# Patient Record
Sex: FEMALE | Race: WHITE | HISPANIC OR LATINO | Employment: FULL TIME | URBAN - METROPOLITAN AREA
[De-identification: names, ages, dates, MRNs, and addresses within clinical notes are randomized per-mention and may not be internally consistent; named-entity substitution may affect disease eponyms.]

---

## 2018-04-27 ENCOUNTER — APPOINTMENT (OUTPATIENT)
Dept: RADIOLOGY | Facility: MEDICAL CENTER | Age: 33
End: 2018-04-27
Attending: EMERGENCY MEDICINE

## 2018-04-27 ENCOUNTER — HOSPITAL ENCOUNTER (EMERGENCY)
Facility: MEDICAL CENTER | Age: 33
End: 2018-04-27
Attending: EMERGENCY MEDICINE

## 2018-04-27 VITALS
DIASTOLIC BLOOD PRESSURE: 85 MMHG | HEIGHT: 60 IN | SYSTOLIC BLOOD PRESSURE: 131 MMHG | TEMPERATURE: 97.8 F | WEIGHT: 142.86 LBS | HEART RATE: 68 BPM | BODY MASS INDEX: 28.05 KG/M2 | OXYGEN SATURATION: 99 % | RESPIRATION RATE: 16 BRPM

## 2018-04-27 DIAGNOSIS — O20.0 THREATENED MISCARRIAGE: ICD-10-CM

## 2018-04-27 LAB
ALBUMIN SERPL BCP-MCNC: 4.9 G/DL (ref 3.2–4.9)
ALBUMIN/GLOB SERPL: 1.4 G/DL
ALP SERPL-CCNC: 43 U/L (ref 30–99)
ALT SERPL-CCNC: 13 U/L (ref 2–50)
ANION GAP SERPL CALC-SCNC: 10 MMOL/L (ref 0–11.9)
APPEARANCE UR: ABNORMAL
AST SERPL-CCNC: 15 U/L (ref 12–45)
B-HCG SERPL-ACNC: ABNORMAL MIU/ML (ref 0–5)
BACTERIA #/AREA URNS HPF: ABNORMAL /HPF
BASOPHILS # BLD AUTO: 0.6 % (ref 0–1.8)
BASOPHILS # BLD: 0.08 K/UL (ref 0–0.12)
BILIRUB SERPL-MCNC: 0.5 MG/DL (ref 0.1–1.5)
BILIRUB UR QL STRIP.AUTO: NEGATIVE
BUN SERPL-MCNC: 8 MG/DL (ref 8–22)
CALCIUM SERPL-MCNC: 10.3 MG/DL (ref 8.5–10.5)
CHLORIDE SERPL-SCNC: 100 MMOL/L (ref 96–112)
CO2 SERPL-SCNC: 24 MMOL/L (ref 20–33)
COLOR UR: YELLOW
CREAT SERPL-MCNC: 0.52 MG/DL (ref 0.5–1.4)
EOSINOPHIL # BLD AUTO: 0.05 K/UL (ref 0–0.51)
EOSINOPHIL NFR BLD: 0.4 % (ref 0–6.9)
EPI CELLS #/AREA URNS HPF: NEGATIVE /HPF
ERYTHROCYTE [DISTWIDTH] IN BLOOD BY AUTOMATED COUNT: 43 FL (ref 35.9–50)
GLOBULIN SER CALC-MCNC: 3.5 G/DL (ref 1.9–3.5)
GLUCOSE SERPL-MCNC: 77 MG/DL (ref 65–99)
GLUCOSE UR STRIP.AUTO-MCNC: NEGATIVE MG/DL
HCG UR QL: POSITIVE
HCT VFR BLD AUTO: 41.8 % (ref 37–47)
HGB BLD-MCNC: 14.1 G/DL (ref 12–16)
HYALINE CASTS #/AREA URNS LPF: ABNORMAL /LPF
IMM GRANULOCYTES # BLD AUTO: 0.05 K/UL (ref 0–0.11)
IMM GRANULOCYTES NFR BLD AUTO: 0.4 % (ref 0–0.9)
KETONES UR STRIP.AUTO-MCNC: NEGATIVE MG/DL
LEUKOCYTE ESTERASE UR QL STRIP.AUTO: NEGATIVE
LIPASE SERPL-CCNC: 7 U/L (ref 11–82)
LYMPHOCYTES # BLD AUTO: 2.63 K/UL (ref 1–4.8)
LYMPHOCYTES NFR BLD: 18.7 % (ref 22–41)
MCH RBC QN AUTO: 30.5 PG (ref 27–33)
MCHC RBC AUTO-ENTMCNC: 33.7 G/DL (ref 33.6–35)
MCV RBC AUTO: 90.3 FL (ref 81.4–97.8)
MICRO URNS: ABNORMAL
MONOCYTES # BLD AUTO: 0.65 K/UL (ref 0–0.85)
MONOCYTES NFR BLD AUTO: 4.6 % (ref 0–13.4)
NEUTROPHILS # BLD AUTO: 10.62 K/UL (ref 2–7.15)
NEUTROPHILS NFR BLD: 75.3 % (ref 44–72)
NITRITE UR QL STRIP.AUTO: NEGATIVE
NRBC # BLD AUTO: 0 K/UL
NRBC BLD-RTO: 0 /100 WBC
NUMBER OF RH DOSES IND 8505RD: NORMAL
PH UR STRIP.AUTO: 7.5 [PH]
PLATELET # BLD AUTO: 352 K/UL (ref 164–446)
PMV BLD AUTO: 9.4 FL (ref 9–12.9)
POTASSIUM SERPL-SCNC: 3.4 MMOL/L (ref 3.6–5.5)
PROT SERPL-MCNC: 8.4 G/DL (ref 6–8.2)
PROT UR QL STRIP: NEGATIVE MG/DL
RBC # BLD AUTO: 4.63 M/UL (ref 4.2–5.4)
RBC # URNS HPF: ABNORMAL /HPF
RBC UR QL AUTO: NEGATIVE
RH BLD: NORMAL
SODIUM SERPL-SCNC: 134 MMOL/L (ref 135–145)
SP GR UR REFRACTOMETRY: 1.02
SP GR UR STRIP.AUTO: 1.02
UROBILINOGEN UR STRIP.AUTO-MCNC: 0.2 MG/DL
WBC # BLD AUTO: 14.1 K/UL (ref 4.8–10.8)
WBC #/AREA URNS HPF: ABNORMAL /HPF

## 2018-04-27 PROCEDURE — 83690 ASSAY OF LIPASE: CPT

## 2018-04-27 PROCEDURE — 81025 URINE PREGNANCY TEST: CPT

## 2018-04-27 PROCEDURE — 86901 BLOOD TYPING SEROLOGIC RH(D): CPT

## 2018-04-27 PROCEDURE — 84702 CHORIONIC GONADOTROPIN TEST: CPT

## 2018-04-27 PROCEDURE — 99284 EMERGENCY DEPT VISIT MOD MDM: CPT

## 2018-04-27 PROCEDURE — 81001 URINALYSIS AUTO W/SCOPE: CPT

## 2018-04-27 PROCEDURE — 85025 COMPLETE CBC W/AUTO DIFF WBC: CPT

## 2018-04-27 PROCEDURE — 80053 COMPREHEN METABOLIC PANEL: CPT

## 2018-04-27 PROCEDURE — 76817 TRANSVAGINAL US OBSTETRIC: CPT

## 2018-04-27 NOTE — ED PROVIDER NOTES
ED Provider Note    CHIEF COMPLAINT  Chief Complaint   Patient presents with   • Abdominal Pain       HPI  Molly Cota is a 32 y.o. female who presents with abdominal discomfort. The patient states the pain started earlier today when she was in a verbal altercation with her ex-. She states her last normal menstrual period was in February. She did take a recent printed test that was positive. She states before that she has had some spotting but does not have any current bleeding. She has not had any recent vaginal discharge. She denies dysuria. The patient has not had any fevers. She does have some associated nausea and vomiting. The patient states the pain is mild in intensity and described as cramping.    REVIEW OF SYSTEMS  See HPI for further details. All other systems are negative.     PAST MEDICAL HISTORY  No past medical history on file.    SOCIAL HISTORY  Social History     Social History   • Marital status:      Spouse name: N/A   • Number of children: N/A   • Years of education: N/A     Social History Main Topics   • Smoking status: Never Smoker   • Smokeless tobacco: Not on file   • Alcohol use No   • Drug use: No   • Sexual activity: Not on file     Other Topics Concern   • Not on file     Social History Narrative   • No narrative on file           PHYSICAL EXAM  VITAL SIGNS: /86   Pulse 70   Temp 36.6 °C (97.8 °F) (Temporal)   Resp 16   Ht 1.524 m (5')   Wt 64.8 kg (142 lb 13.7 oz)   SpO2 98%   BMI 27.90 kg/m²   Constitutional: Well developed, Well nourished, No acute distress, Non-toxic appearance.   HENT: Normocephalic, Atraumatic, tympanic membranes are intact and nonerythematous bilaterally, Oropharynx moist without exudates or erythema, Nose normal.   Eyes: PERRLA, EOMI, Conjunctiva normal.  Neck: Supple without meningismus  Lymphatic: No lymphadenopathy noted.   Cardiovascular: Normal heart rate, Normal rhythm, No murmurs, No rubs, No gallops.   Thorax &  Lungs: Normal breath sounds, No respiratory distress, No wheezing, No chest tenderness.   Abdomen: Bowel sounds normal, Soft, No tenderness, no rebound, no guarding, no distention, No masses, No pulsatile masses.   Skin: Warm, Dry, No erythema, No rash.   Back: No tenderness, No CVA tenderness.   Extremities: Atraumatic with symmetric distal pulses, No edema, No tenderness, No cyanosis, No clubbing.   Neurologic: Alert & oriented x 3, cranial nerves II through XII are intact, Normal motor function, Normal sensory function, No focal deficits noted.   Psychiatric: Affect normal, Judgment normal, Mood normal.     RADIOLOGY/PROCEDURES  US-OB PELVIS TRANSVAGINAL   Final Result         1. Single living intrauterine pregnancy at  6 weeks 3 days estimated gestational age.      2. Small subchorionic hemorrhage.                  COURSE & MEDICAL DECISION MAKING  Pertinent Labs & Imaging studies reviewed. (See chart for details)  This a 32-year-old female who presents with cramping abdominal pain and spotting. The ultrasound does show intrauterine implantation with a small subchorionic hemorrhage. I suspect this is the source of bleeding. The patient will therefore drink lots of fluids and she'll follow up with the pregnancy Center. She will return for increased bleeding or pain. Otherwise her abdomen is benign and she does not have any evidence of surgical process.    FINAL IMPRESSION  1. Threatened miscarriage  2. Intrauterine abdominal pain     Disposition  The patient will be discharged in stable condition    Electronically signed by: Mike Salamanca, 4/27/2018 1:45 PM

## 2018-04-27 NOTE — DISCHARGE INSTRUCTIONS
Drink lots of fluids  Follow-up with the pregnancy Center as discussed  Return for increased pain or bleeding

## 2018-04-27 NOTE — ED TRIAGE NOTES
Patient comes in stating she is pregnant, she does not know how far long she is.  Complaints of abd pain, has not seen an ob yet.  Some spotting noted.  Positive n/v.

## 2018-04-30 ENCOUNTER — NON-PROVIDER VISIT (OUTPATIENT)
Dept: OBGYN | Facility: CLINIC | Age: 33
End: 2018-04-30

## 2018-04-30 DIAGNOSIS — Z32.01 PREGNANCY EXAMINATION OR TEST, POSITIVE RESULT: ICD-10-CM

## 2018-04-30 LAB
INT CON NEG: NEGATIVE
INT CON POS: POSITIVE
POC URINE PREGNANCY TEST: POSITIVE

## 2018-04-30 PROCEDURE — 81025 URINE PREGNANCY TEST: CPT | Performed by: OBSTETRICS & GYNECOLOGY

## 2018-06-08 ENCOUNTER — INITIAL PRENATAL (OUTPATIENT)
Dept: OBGYN | Facility: CLINIC | Age: 33
End: 2018-06-08

## 2018-06-08 ENCOUNTER — HOSPITAL ENCOUNTER (OUTPATIENT)
Facility: MEDICAL CENTER | Age: 33
End: 2018-06-08
Attending: NURSE PRACTITIONER
Payer: COMMERCIAL

## 2018-06-08 ENCOUNTER — HOSPITAL ENCOUNTER (OUTPATIENT)
Dept: LAB | Facility: MEDICAL CENTER | Age: 33
End: 2018-06-08
Attending: NURSE PRACTITIONER
Payer: COMMERCIAL

## 2018-06-08 VITALS
HEIGHT: 60 IN | BODY MASS INDEX: 27.88 KG/M2 | DIASTOLIC BLOOD PRESSURE: 60 MMHG | SYSTOLIC BLOOD PRESSURE: 114 MMHG | WEIGHT: 142 LBS

## 2018-06-08 DIAGNOSIS — Z34.82 ENCOUNTER FOR SUPERVISION OF OTHER NORMAL PREGNANCY, SECOND TRIMESTER: Primary | ICD-10-CM

## 2018-06-08 DIAGNOSIS — Z34.82 ENCOUNTER FOR SUPERVISION OF OTHER NORMAL PREGNANCY, SECOND TRIMESTER: ICD-10-CM

## 2018-06-08 LAB
ABO GROUP BLD: NORMAL
APPEARANCE UR: CLEAR
APPEARANCE UR: CLEAR
BASOPHILS # BLD AUTO: 0.3 % (ref 0–1.8)
BASOPHILS # BLD: 0.03 K/UL (ref 0–0.12)
BILIRUB UR QL STRIP.AUTO: NEGATIVE
BILIRUB UR STRIP-MCNC: NORMAL MG/DL
BLD GP AB SCN SERPL QL: NORMAL
COLOR UR AUTO: YELLOW
COLOR UR: YELLOW
EOSINOPHIL # BLD AUTO: 0.09 K/UL (ref 0–0.51)
EOSINOPHIL NFR BLD: 1 % (ref 0–6.9)
ERYTHROCYTE [DISTWIDTH] IN BLOOD BY AUTOMATED COUNT: 40.4 FL (ref 35.9–50)
GLUCOSE UR STRIP.AUTO-MCNC: NEGATIVE MG/DL
GLUCOSE UR STRIP.AUTO-MCNC: NEGATIVE MG/DL
HBV SURFACE AG SER QL: NEGATIVE
HCT VFR BLD AUTO: 39.1 % (ref 37–47)
HGB BLD-MCNC: 13.4 G/DL (ref 12–16)
HIV 1+2 AB+HIV1 P24 AG SERPL QL IA: NON REACTIVE
IMM GRANULOCYTES # BLD AUTO: 0.02 K/UL (ref 0–0.11)
IMM GRANULOCYTES NFR BLD AUTO: 0.2 % (ref 0–0.9)
KETONES UR STRIP.AUTO-MCNC: NEGATIVE MG/DL
KETONES UR STRIP.AUTO-MCNC: NEGATIVE MG/DL
LEUKOCYTE ESTERASE UR QL STRIP.AUTO: NEGATIVE
LEUKOCYTE ESTERASE UR QL STRIP.AUTO: NORMAL
LYMPHOCYTES # BLD AUTO: 1.71 K/UL (ref 1–4.8)
LYMPHOCYTES NFR BLD: 19.9 % (ref 22–41)
MCH RBC QN AUTO: 31.1 PG (ref 27–33)
MCHC RBC AUTO-ENTMCNC: 34.3 G/DL (ref 33.6–35)
MCV RBC AUTO: 90.7 FL (ref 81.4–97.8)
MICRO URNS: ABNORMAL
MONOCYTES # BLD AUTO: 0.39 K/UL (ref 0–0.85)
MONOCYTES NFR BLD AUTO: 4.5 % (ref 0–13.4)
NEUTROPHILS # BLD AUTO: 6.36 K/UL (ref 2–7.15)
NEUTROPHILS NFR BLD: 74.1 % (ref 44–72)
NITRITE UR QL STRIP.AUTO: NEGATIVE
NITRITE UR QL STRIP.AUTO: NEGATIVE
NRBC # BLD AUTO: 0 K/UL
NRBC BLD-RTO: 0 /100 WBC
PH UR STRIP.AUTO: 7 [PH] (ref 5–8)
PH UR STRIP.AUTO: 7.5 [PH]
PLATELET # BLD AUTO: 288 K/UL (ref 164–446)
PMV BLD AUTO: 10 FL (ref 9–12.9)
PROT UR QL STRIP: NEGATIVE MG/DL
PROT UR QL STRIP: NORMAL MG/DL
RBC # BLD AUTO: 4.31 M/UL (ref 4.2–5.4)
RBC UR QL AUTO: NEGATIVE
RBC UR QL AUTO: NEGATIVE
RH BLD: NORMAL
RUBV AB SER QL: >500 IU/ML
SP GR UR STRIP.AUTO: 1.01
SP GR UR STRIP.AUTO: 1.01
TREPONEMA PALLIDUM IGG+IGM AB [PRESENCE] IN SERUM OR PLASMA BY IMMUNOASSAY: NON REACTIVE
UROBILINOGEN UR STRIP-MCNC: NORMAL MG/DL
UROBILINOGEN UR STRIP.AUTO-MCNC: 0.2 MG/DL
WBC # BLD AUTO: 8.6 K/UL (ref 4.8–10.8)

## 2018-06-08 PROCEDURE — 81002 URINALYSIS NONAUTO W/O SCOPE: CPT | Performed by: NURSE PRACTITIONER

## 2018-06-08 PROCEDURE — 59401 PR NEW OB VISIT: CPT | Performed by: NURSE PRACTITIONER

## 2018-06-08 NOTE — PROGRESS NOTES
Pt here for New OB today  Phone: [phone]  Pharmacy verified  Pt is taking PNV  Desires AFP  Declines BTL  Pt seen at [facility] ER on 4/27/18 for abdominal pain and vaginal bleeding; resolved

## 2018-06-08 NOTE — LETTER
Estudios Para Detectar los Portadores de la Fibrosis Quística   (La Enfermedad Fibroquística del Páncreas)    Molly Cota    Esta información esta relacionada con un examen de chris que puede determinar si usted o atwood ernesto es portador del gen que causa la enfermedad hereditaria que se llama la fibrosis quística.     ¿QUE ES LA ENFERMEDAD FIBROSIS QUÍSTICA?  · La  fibrosis quística es george enfermedad hereditaria que afecta a más de 25,000 niños y jóvenes adultos americanos.  · Los síntomas de la fibrosis quística varían de george persona a otra.  Los síntomas más comunes son congestión pulmonar, diarrea y retraso en el crecimiento del latosha.  La mayoría de las personas con la fibrosis quística padecen de problemas médicos muy severos, y algunas mueren jóvenes.  Otras tienen tan pocos síntomas que no se percatan de que tienen fibrosis quística.  · La fibrosis quística no afecta en absoluto la inteligencia de la persona.  · Aunque actualmente no hay george deja para la fibrosis quística, los científicos están avanzando con respecto a nuevos tratamientos y en la búsqueda de la deja.  Anteriormente la mayoría de las personas que padecían de fibrosis quística morían muy jóvenes.  Hoy en día, muchas personas que padecen de la fibrosis quística sobreviven hasta los 20 o 30 anos de edad.    ¿EXISTE LA POSIBILIDAD DE QUE MI MAGDA PUEDA TENER FIBROSIS QUÍSTICA?  · Usted puede tener un hijo con la fibrosis quística aun cuando no hay nadie en atwood jamari que la padezca.  (To la grafica que sigue.)  · Existe george prueba que puede ayudarle a determinar si juan un gen para la fibrosis quística y si por eso corre un riesgo de tener un hijo con la enfermedad.  · Si ambos padres son portadores del gen para la fibrosis quística, hay george (1) probabilidad entre 4 (25%) en cada embarazo, de que puedan tener un hijo afectada con fibrosis quística.  · Los portadores del gen para la fibrosis quística tienen george copia del gen  normal y el otro gen alterado.  · Las personas con fibrosis quística tienen dos genes alterados para la fibrosis quística,  · Normalmente la mayoría de individuos tienen dos copias normales del gen para fibrosis quística.    Riesgo aproximado de que george ernesto sin familiares con fibrosis quística pueda tener un hijo con fibrosis quística:  Origen / Riesgo  George ernesto Caucásica (de carla meeta):  1 en 2,500  George ernesto :  1 en 8,000  George ernesto Afroamericana (de carla sarah):  1 en 15,000  George ernesto Asiática:  1 en 32,000    ¿EXISTEN PRUEBAS PARA DETECTAR LOS PORTADORES DE LA FIBROSIS QUÍSTICA?  · Hay george prueba de chris para determinar si usted o atwood ernesto portan el gen para la fibrosis quística.  · Es importante entender que la prueba no detecta todos los portadores del gen para la fibrosis quística.  · Si la prueba determina que ambos padres con portadores, se puede realizar otras pruebas para determinar si atwood futuro elo esta afectado.    ¿CUANTO LAVINIA LA PRUEBA PARA DETERMINAR SI SOY PORTADOR(A) DEL GEN PARA LA FIBROSIS QUÍSTICA?  · El costo de la prueba varia según atwood póliza de seguro medico y el laboratorio donde se efectúa el análisis.  · El costo promedio de la prueba es de $300.  · [place]wood consejera genética puede darle mas información acera de esta prueba para determinar si usted es portador de la fibrosis quística.    _____  Si, yo estoy interesada y me gustaria obtener mas información al respecto.  _____  No tengo interés ni en hacerme la prueba para determinar si soy portador(a) de gen para la fibrosis quística ni en recibir mas información al respecto.    Firma del paciente: _____________________________   6/8/2018

## 2018-06-08 NOTE — PATIENT INSTRUCTIONS
P:  1.  GC/CT done. Pap done.         2.  Prenatal labs ordered - lab slip given        3.  Discussed PNV, diet, and adequate water intake        4.  NOB packet given        5.  Return to office in 4 wks        6.  Complete OB US in 7 wks

## 2018-06-08 NOTE — PROGRESS NOTES
Subjective:   Molly Cota is a 32 y.o.   @ EGA: 13w4d ARIANNA: Estimated Date of Delivery: 12/10/18  per [facility] c/w US in ER who presents for her new OB exam.  She has no complaints.  Desires AFP.  Declines CF.  Reports faint FM.  Denies VB, LOF, or cramping.  Denies dysuria, vaginal DC.  Pt is single and lives with son. Works in a restaurant.  Pregnancy is unplanned but wanted.     Initial Prenatal Visit          HPI  Review of Systems   All other systems reviewed and are negative.         Objective:     /60   Ht 1.524 m (5')   Wt 64.4 kg (142 lb)   LMP 2018   BMI 27.73 kg/m²    Wet mount: deferred  FHTs: 162  Fundal ht: 13     Physical Exam   Constitutional: She is oriented to person, place, and time. She appears well-developed and well-nourished.   HENT:   Head: Normocephalic and atraumatic.   Eyes:   Eye and ear exam deferred   Neck: Normal range of motion. Neck supple. No thyromegaly present.   Cardiovascular: Normal rate, regular rhythm, normal heart sounds and intact distal pulses.    Pulmonary/Chest: Effort normal and breath sounds normal. Right breast exhibits no inverted nipple, no mass, no nipple discharge, no skin change and no tenderness. Left breast exhibits no inverted nipple, no mass, no nipple discharge, no skin change and no tenderness.   Abdominal: Soft. Bowel sounds are normal.   Genitourinary: Vagina normal and uterus normal. Pelvic exam was performed with patient supine. There is no rash, tenderness, lesion or injury on the right labia. There is no rash, tenderness, lesion or injury on the left labia. Cervix exhibits no motion tenderness, no discharge and no friability. Right adnexum displays no mass, no tenderness and no fullness. Left adnexum displays no mass, no tenderness and no fullness.   Genitourinary Comments: NSVDx1 - proven to 2700g   Musculoskeletal: Normal range of motion.   Neurological: She is alert and oriented to person, place, and time.    Skin: Skin is warm and dry. Capillary refill takes less than 2 seconds.   Psychiatric: She has a normal mood and affect. Her behavior is normal. Judgment and thought content normal.   Nursing note and vitals reviewed.        A:   1.  IUP @ 13w4d ARIANNA: Estimated Date of Delivery: 12/10/18 per [facility]         2.  S=D        3.    Patient Active Problem List    Diagnosis Date Noted   • Encounter for supervision of other normal pregnancy, second trimester 06/08/2018         P:  1.  GC/CT done. Pap done.         2.  Prenatal labs ordered - lab slip given        3.  Discussed PNV, diet, and adequate water intake        4.  NOB packet given        5.  Return to office in 4 wks        6.  Complete OB US in 7 wks

## 2018-06-09 ENCOUNTER — APPOINTMENT (OUTPATIENT)
Dept: RADIOLOGY | Facility: MEDICAL CENTER | Age: 33
End: 2018-06-09
Attending: EMERGENCY MEDICINE

## 2018-06-09 ENCOUNTER — HOSPITAL ENCOUNTER (EMERGENCY)
Facility: MEDICAL CENTER | Age: 33
End: 2018-06-09
Attending: EMERGENCY MEDICINE

## 2018-06-09 VITALS
SYSTOLIC BLOOD PRESSURE: 125 MMHG | TEMPERATURE: 98.1 F | DIASTOLIC BLOOD PRESSURE: 92 MMHG | RESPIRATION RATE: 16 BRPM | BODY MASS INDEX: 28.42 KG/M2 | OXYGEN SATURATION: 99 % | HEART RATE: 68 BPM | WEIGHT: 145.5 LBS

## 2018-06-09 DIAGNOSIS — O44.02 PLACENTA PREVIA ANTEPARTUM IN SECOND TRIMESTER: ICD-10-CM

## 2018-06-09 DIAGNOSIS — O46.90 VAGINAL BLEEDING IN PREGNANCY: ICD-10-CM

## 2018-06-09 PROCEDURE — 99284 EMERGENCY DEPT VISIT MOD MDM: CPT

## 2018-06-09 PROCEDURE — 76817 TRANSVAGINAL US OBSTETRIC: CPT

## 2018-06-09 PROCEDURE — 36415 COLL VENOUS BLD VENIPUNCTURE: CPT

## 2018-06-09 ASSESSMENT — PAIN SCALES - GENERAL
PAINLEVEL_OUTOF10: 1
PAINLEVEL_OUTOF10: 1

## 2018-06-10 NOTE — ED NOTES
Report received.  Assumed care.  Pt assessed, A&O x 4.  Educated on fall precautions and pt verbalizes understanding.  Call light and monitor within reach, bed in lowest position.  Will continue to monitor.

## 2018-06-10 NOTE — DISCHARGE INSTRUCTIONS
Placenta previa   (Placenta Previa)  La placenta previa es un trastorno que padece george diamond embarazada cuando la placenta se implanta en la parte inferior del útero. La placenta cubre de manera parcial o total la abertura del gerald del útero. Es un problema, ya que el bebé debe pasar a través del gerald del útero miller el parto. Hay clayton tipos de placenta previa. Ellos son:   1. Placenta previa marginal. La placenta está cerca del gerald uterino rosibel no cubre la abertura.  2. Placenta previa parcial. La placenta cubre george parte de la abertura del gerald del útero.  3. Placenta previa completa. La placenta cubre toda la abertura del gerald del útero.    Según el tipo de placenta previa,existe la probabilidad de que la placenta pueda ubicarse en george posición normal y que deje de cubrir el gerald del útero, a medida que el embarazo avanza. Es importante que cumpla con todos los controles prenatales.   FACTORES DE RIESGO   Es más probable que sufra placenta previa si:   · Está embarazada de más de un bebé (embarazo múltiple).    · La forma de atwood útero no es normal.    · Tiene cicatrices en el revestimiento interno del útero.    · Tuvo cirugías anteriores en el útero, laz george cesárea.    · Ya ha tenido un bebé.    · Tiene george historia de placenta previa.    · Ha fumado o ingerido cocaína miller el embarazo.    · Está embarazada y tiene 35 años o más.    SÍNTOMAS   El síntoma principal de la placenta previa es un sangrado vaginal indoloro, súbito, miller la segunda mitad del embarazo. La cantidad del sangrado puede ser desde ligeramente leve a abundante. El sangrado puede detenerse por sí mismo, rosibel siempre vuelve a ocurrir. También puede eleazar cólicos, contracciones regulares, dolor abdominal y dolor en la cintura.   DIAGNÓSTICO   La placenta previa puede diagnosticarse con george ecografía, encontrando la ubicación de la placenta. La ecografía puede encontrar la placenta previa ya sea miller george visita prenatal de  rutina o luego de que se advierte un sangrado vaginal. Si le diagnostican placenta previa, el médico evitará los exámenes vaginales para reducir el riesgo de un sangrado abundante. Es posible que la placenta previa no se diagnostique hasta que se produzca el sangrado miller el trabajo de parto.   TRATAMIENTO   El tratamiento específico depende de:   · La cantidad de sangrado o si éste se ha detenido.  · En qué etapa se encuentra del embarazo.    · El estado del bebé.    · La ubicación del bebé y de la placenta.    · El tipo de placenta previa.    Según esos factores, el médico podrá indicar:   · Que disminuya la actividad física    · Hacer reposo en cama, en atwood casa o en el hospital.  · Reposo pélvico [place] significa que no tenga relaciones sexuales, no use tampones, no se lisa exámenes pélvicos ni introduzca nada dentro de la vagina.  · George transfusión sanguínea para reponer la pérdida de chris materna.  · George cesárea si el sangrado es abundante y no puede ser controlado o si la placenta cubre completamente el gerald.  · Medicamentos para detener un trabajo de parto prematuro o para que maduren los pulmones del feto si es necesario iniciar el parto antes de que el embarazo llegue a término.    ¿EN QUÉ MOMENTO DEBE BUSCAR ASISTENCIA MÉDICA DE INMEDIATO SI LA ENVÍAN A ATWOOD CASA CON EL DIAGNÓSTICO DE PLACENTA PREVIA?   Solicite atención médica de inmediato si tiene síntomas de placenta previa. Debe concurrir al hospital para ser controlada inmediatamente. Nuevamente, esos síntomas son:   · Sangrado vaginal repentino e indoloro, aún george pequeña cantidad.  · Cólicos o contracciones regulares.  · Dolor en la cintura o en el abdomen.  Esta información no tiene laz fin reemplazar el consejo del médico. Asegúrese de hacerle al médico cualquier pregunta que tenga.  Document Released: 09/27/2006 Document Revised: 08/20/2014  Elsevier Interactive Patient Education © 2017 Elsevier Inc.

## 2018-06-10 NOTE — ED TRIAGE NOTES
Molly Cota    Chief Complaint   Patient presents with   • Vaginal Bleeding   • Pregnancy     13 weeks   • Abdominal Cramping       Pt  ambulatory to triage with above complaint.Symptoms started 15 min ago.  Pt at work and noticed bright red blood after using bathroom.  Pt denying clots. +bilateral lower ab pain. Pt states she is 13 weeks pregnant. VSS. Pt given UA cup and maternity pad.    Pt returned to lob, educated on triage process, and to inform staff of any changes or concerns.    Blood Pressure: 125/92, Pulse: 91, Respiration: 16, Temperature: 36.7 °C (98.1 °F), Weight: 66 kg (145 lb 8.1 oz), Pulse Oximetry: 96 %, O2 Delivery: None (Room Air)

## 2018-06-10 NOTE — ED PROVIDER NOTES
ED Provider Note    CHIEF COMPLAINT  Chief Complaint   Patient presents with   • Vaginal Bleeding   • Pregnancy     13 weeks   • Abdominal Cramping       HPI  Molly Cota is a 32 y.o. female who presents for evaluation of vaginal bleeding in the setting of pregnancy,  at approximately 13 weeks, bleeding began about 15 minutes prior to arrival associated with pelvic cramping.  No dysuria or hematuria, no back pain, no chest pain, shortness of breath or fever.  The patient reports bleeding earlier on in this pregnancy has since resolved she has had no issues since.  Was evaluated by her obstetrics team yesterday, no comp occasions at that time and everything was going well.    REVIEW OF SYSTEMS  Negative for fever, rash, chest pain, dyspnea, nausea, vomiting, diarrhea, headache, focal weakness, focal numbness, focal tingling, back pain. All other systems are negative.     PAST MEDICAL HISTORY  History reviewed. No pertinent past medical history.    FAMILY HISTORY  Family History   Problem Relation Age of Onset   • No Known Problems Mother    • No Known Problems Sister    • No Known Problems Brother        SOCIAL HISTORY  Social History   Substance Use Topics   • Smoking status: Former Smoker     Packs/day: 0.10     Quit date: 2018   • Smokeless tobacco: Never Used   • Alcohol use No       SURGICAL HISTORY  History reviewed. No pertinent surgical history.    CURRENT MEDICATIONS  I personally reviewed the medication list in the charting documentation.     ALLERGIES  No Known Allergies    MEDICAL RECORD  I have reviewed patient's medical record and pertinent results are listed above.      PHYSICAL EXAM  VITAL SIGNS: /92   Pulse 91   Temp 36.7 °C (98.1 °F) (Temporal)   Resp 16   Wt 66 kg (145 lb 8.1 oz)   LMP 2018   SpO2 96%   BMI 28.42 kg/m²    Constitutional: Well appearing patient in no acute distress.  Not toxic, nor ill in appearance.  HENT: Mucus membranes moist.    Eyes: No  scleral icterus. Normal conjunctiva   Neck: Supple, comfortable, nonpainful range of motion.   Cardiovascular: Regular heart rate and rhythm.   Thorax & Lungs: Chest is nontender.  Lungs are clear to auscultation with good air movement bilaterally.  No wheeze, rhonchi, nor rales.   Abdomen: Soft, not obviously distended, minimal suprapubic tenderness, no rebound, no guarding  Skin: Warm, dry. No rash appreciated  Extremities/Musculoskeletal: No sign of trauma. No asymmetric calf tenderness, erythema or edema. Normal range of motion   Neurologic: Alert & oriented. No focal deficits observed.   Psychiatric: Normal affect appropriate for the clinical situation.    DIAGNOSTIC STUDIES / PROCEDURES    RADIOLOGY  US-OB PELVIS TRANSVAGINAL   Final Result         1. Single living intrauterine pregnancy at  13 weeks 4 days estimated gestational age.      2. Complete placenta previa.      3. A 2.2 cm perigestational hemorrhage.                  COURSE & MEDICAL DECISION MAKING  I have reviewed any medical record information, laboratory studies and radiographic results as noted above.    Encounter Summary: This is a 32 y.o. female with bleeding in the setting of pregnancy, just this began about 15 minutes prior to arrival, evaluated yesterday by her obstetrics team, urinalysis was normal, blood work is unremarkable, she is Rh+ based on prior blood work.  At this point, really I think the only thing we need to do is an ultrasound ------ the ultrasound reveals a complete placenta previa, I spoke to the on-call obstetrician who is on-call for Dr. Narvaez, the patient's own after nutrition, she recommends complete pelvic rest and very close follow-up to the obstetrician, strict return instructions discussed      DISPOSITION: Discharged home in stable condition      FINAL IMPRESSION  1. Placenta previa antepartum in second trimester    2. Vaginal bleeding in pregnancy           This dictation was created using voice recognition  software. The accuracy of the dictation is limited to the abilities of the software. I expect there may be some errors of grammar and possibly content. The nursing notes were reviewed and certain aspects of this information were incorporated into this note.    Electronically signed by: Lincoln Broussard, 6/9/2018 6:24 PM

## 2018-06-11 ENCOUNTER — ROUTINE PRENATAL (OUTPATIENT)
Dept: OBGYN | Facility: CLINIC | Age: 33
End: 2018-06-11

## 2018-06-11 VITALS — BODY MASS INDEX: 27.93 KG/M2 | DIASTOLIC BLOOD PRESSURE: 72 MMHG | WEIGHT: 143 LBS | SYSTOLIC BLOOD PRESSURE: 120 MMHG

## 2018-06-11 DIAGNOSIS — O44.02 PLACENTA PREVIA ANTEPARTUM IN SECOND TRIMESTER: ICD-10-CM

## 2018-06-11 DIAGNOSIS — D25.9 UTERINE FIBROID IN PREGNANCY: ICD-10-CM

## 2018-06-11 DIAGNOSIS — O34.10 UTERINE FIBROID IN PREGNANCY: ICD-10-CM

## 2018-06-11 DIAGNOSIS — Z34.82 ENCOUNTER FOR SUPERVISION OF OTHER NORMAL PREGNANCY, SECOND TRIMESTER: Primary | ICD-10-CM

## 2018-06-11 PROCEDURE — 90040 PR PRENATAL FOLLOW UP: CPT | Performed by: NURSE PRACTITIONER

## 2018-06-11 NOTE — PROGRESS NOTES
S: Pt is  at 14w0d here for fit in after ER visit.  Reports that bleeding has improved.  Reports good FM.  Denies VB, LOF,  RUCs or vaginal DC.     O:  Please see above vitals        FHTs: 150        Fundal ht: 14 cm         Pap smear: pending results    A: IUP 14w0d  Patient Active Problem List    Diagnosis Date Noted   • Placenta previa antepartum in second trimester 2018   • Uterine fibroid in pregnancy 2018   • Encounter for supervision of other normal pregnancy, second trimester 2018       P:  1.  Reviewed labs w pt.        2.  Desires AFP.        3.  US is 18.        4.  Questions answered.        5.  Encouraged adequate water intake.        6.  F/u 4 wks.        7.  Previa precautions reviewed w pt.

## 2018-06-11 NOTE — PATIENT INSTRUCTIONS
P:  1.  Reviewed labs w pt.        2.  Desires AFP.        3.  US is 7/30/18.        4.  Questions answered.        5.  Encouraged adequate water intake.        6.  F/u 4 wks.        7.  Previa precautions reviewed w pt.

## 2018-06-11 NOTE — PROGRESS NOTES
Pt. Here for OB/FU today.   U/S on 7/30/18  Good # [phone]  Pt states still having bleeding and stomach pains.   Pt states went to [facility] ER on 6/9/18 for bleeding.  Pharmacy verified.   AFP lab slip given today along with instructions.

## 2018-06-12 DIAGNOSIS — N76.0 BV (BACTERIAL VAGINOSIS): Primary | ICD-10-CM

## 2018-06-12 DIAGNOSIS — B96.89 BV (BACTERIAL VAGINOSIS): Primary | ICD-10-CM

## 2018-06-12 RX ORDER — METRONIDAZOLE 500 MG/1
500 TABLET ORAL EVERY 12 HOURS
Qty: 14 TAB | Refills: 0 | Status: SHIPPED | OUTPATIENT
Start: 2018-06-12 | End: 2018-06-19

## 2018-06-13 ENCOUNTER — TELEPHONE (OUTPATIENT)
Dept: OBGYN | Facility: CLINIC | Age: 33
End: 2018-06-13

## 2018-06-13 LAB
C TRACH DNA GENITAL QL NAA+PROBE: NEGATIVE
CYTOLOGY REG CYTOL: NORMAL
N GONORRHOEA DNA GENITAL QL NAA+PROBE: NEGATIVE
SPECIMEN SOURCE: NORMAL

## 2018-06-13 NOTE — TELEPHONE ENCOUNTER
Notes recorded by Juanis Ryder C.N.M. on 6/12/2018 at 4:36 PM PDT  +BV - rx sent to pharmacy. Please call pt and have her pick it up.  Pt returned Emily's call.  Was notified of vag infection and advised to finish whole treatment, no intercourse while on medication.   Verbalized understanding.

## 2018-06-29 ENCOUNTER — HOSPITAL ENCOUNTER (OUTPATIENT)
Dept: LAB | Facility: MEDICAL CENTER | Age: 33
End: 2018-06-29
Attending: NURSE PRACTITIONER
Payer: COMMERCIAL

## 2018-06-29 DIAGNOSIS — Z34.82 ENCOUNTER FOR SUPERVISION OF OTHER NORMAL PREGNANCY, SECOND TRIMESTER: ICD-10-CM

## 2018-07-05 LAB
# FETUSES US: ABNORMAL
AFP MOM SERPL: 0.54
AFP SERPL-MCNC: 20 NG/ML
AGE - REPORTED: 33.1 YR
CURRENT SMOKER: NO
FAMILY MEMBER DISEASES HX: NO
GA METHOD: ABNORMAL
GA: ABNORMAL WK
HCG MOM SERPL: 2.26
HCG SERPL-ACNC: ABNORMAL IU/L
HX OF HEREDITARY DISORDERS: NO
IDDM PATIENT QL: NO
INHIBIN A MOM SERPL: 2.09
INHIBIN A SERPL-MCNC: 349 PG/ML
INTEGRATED SCN PATIENT-IMP: ABNORMAL
PATHOLOGY STUDY: ABNORMAL
SPECIMEN DRAWN SERPL: ABNORMAL
U ESTRIOL MOM SERPL: 0.77
U ESTRIOL SERPL-MCNC: 0.81 NG/ML

## 2018-07-09 ENCOUNTER — ROUTINE PRENATAL (OUTPATIENT)
Dept: OBGYN | Facility: CLINIC | Age: 33
End: 2018-07-09

## 2018-07-09 VITALS — SYSTOLIC BLOOD PRESSURE: 108 MMHG | DIASTOLIC BLOOD PRESSURE: 62 MMHG | BODY MASS INDEX: 27.54 KG/M2 | WEIGHT: 141 LBS

## 2018-07-09 DIAGNOSIS — O28.0 ABNORMAL ANTENATAL AFP SCREEN: ICD-10-CM

## 2018-07-09 DIAGNOSIS — D25.9 UTERINE FIBROID IN PREGNANCY: ICD-10-CM

## 2018-07-09 DIAGNOSIS — Z34.82 ENCOUNTER FOR SUPERVISION OF OTHER NORMAL PREGNANCY, SECOND TRIMESTER: ICD-10-CM

## 2018-07-09 DIAGNOSIS — O34.10 UTERINE FIBROID IN PREGNANCY: ICD-10-CM

## 2018-07-09 DIAGNOSIS — O44.02 PLACENTA PREVIA ANTEPARTUM IN SECOND TRIMESTER: ICD-10-CM

## 2018-07-09 PROCEDURE — 90040 PR PRENATAL FOLLOW UP: CPT | Performed by: NURSE PRACTITIONER

## 2018-07-09 NOTE — PROGRESS NOTES
SUBJECTIVE:  Pt is a 32 y.o.   at 18w0d  gestation. Presents today for follow-up prenatal care. Reports no issues at this time. No n/v any longer. States eating well. Reports positive  fetal movement. Denies cramping/contractions, bleeding or leaking of fluid. Denies dysuria, headaches, N/V, or other issues at this time. Generally feels well today.     OBJECTIVE:  - See prenatal vitals flow  -   Vitals:    18 0919   BP: 108/62   Weight: 64 kg (141 lb)      Labs - elevated AFP  US scheduled. ER ultrasound previa.            ASSESSMENT:   - IUP at 18w0d    - S=D   -   Patient Active Problem List    Diagnosis Date Noted   • Abnormal  AFP screen 2018   • Placenta previa antepartum in second trimester 2018   • Uterine fibroid in pregnancy 2018   • Encounter for supervision of other normal pregnancy, second trimester 2018         PLAN:  - S/sx pregnancy and labor warning signs vs general discomforts discussed  - Fetal movements and kick counts reviewed   - Adequate hydration reinforced  - Nutrition/exercise/vitamin education; continued PNV  -patient understands placenta previa. AFP discussed and referral given to Dr. Pena office.

## 2018-07-09 NOTE — PROGRESS NOTES
OB f/u. + fetal movement.  Good phone # [phone]  Preferred pharmacy confirmed.  US scheduled for 7-30-18  AFP re-calculation done 7-5-18  Pt c/o some cramping; denies vaginal bleeding, LOF and UC

## 2018-07-10 NOTE — PROGRESS NOTES
Referral faxed to Dr. Pena on 7/10/18.  They will contact patient to schedule appt.  Please check with patient if appt was made/ document. Thank you

## 2018-07-30 ENCOUNTER — HOSPITAL ENCOUNTER (OUTPATIENT)
Dept: LAB | Facility: MEDICAL CENTER | Age: 33
End: 2018-07-30
Attending: OBSTETRICS & GYNECOLOGY
Payer: COMMERCIAL

## 2018-08-06 ENCOUNTER — APPOINTMENT (OUTPATIENT)
Dept: RADIOLOGY | Facility: IMAGING CENTER | Age: 33
End: 2018-08-06
Attending: NURSE PRACTITIONER

## 2018-08-06 ENCOUNTER — ROUTINE PRENATAL (OUTPATIENT)
Dept: OBGYN | Facility: CLINIC | Age: 33
End: 2018-08-06

## 2018-08-06 VITALS — BODY MASS INDEX: 28.71 KG/M2 | SYSTOLIC BLOOD PRESSURE: 110 MMHG | DIASTOLIC BLOOD PRESSURE: 60 MMHG | WEIGHT: 147 LBS

## 2018-08-06 DIAGNOSIS — Z34.82 ENCOUNTER FOR SUPERVISION OF OTHER NORMAL PREGNANCY, SECOND TRIMESTER: ICD-10-CM

## 2018-08-06 DIAGNOSIS — D25.9 UTERINE FIBROID IN PREGNANCY: ICD-10-CM

## 2018-08-06 DIAGNOSIS — O34.10 UTERINE FIBROID IN PREGNANCY: ICD-10-CM

## 2018-08-06 DIAGNOSIS — O44.02 PLACENTA PREVIA ANTEPARTUM IN SECOND TRIMESTER: ICD-10-CM

## 2018-08-06 PROCEDURE — 90040 PR PRENATAL FOLLOW UP: CPT | Performed by: NURSE PRACTITIONER

## 2018-08-06 NOTE — PROGRESS NOTES
OB f/u. + fetal movement.  No VB, LOF or UC's.  Good phone #  [phone]  Pt had appt with Dr. Pena on 7-30-18, pt reports had amnio test done   Pt denies any problems  U/S scheduled for later today

## 2018-08-06 NOTE — PROGRESS NOTES
SUBJECTIVE:  Pt is a 32 y.o.   at 22w0d  gestation. Presents today for follow-up prenatal care. Reports no issues at this time. Had appt with Dr. Pena on  with ultrasound and amniocentesis done. Results pending.  Reports good  fetal movement. Denies cramping/contractions, bleeding or leaking of fluid. Denies dysuria, headaches, N/V, or other issues at this time. Generally feels well today.     OBJECTIVE:  - See prenatal vitals flow  -   Vitals:    18 0950   BP: 110/60   Weight: 66.7 kg (147 lb)      Labs - abnormal AFP  US - no results yet from Dr. Pena.            ASSESSMENT:   - IUP at 22w0d    - S=D   -   Patient Active Problem List    Diagnosis Date Noted   • Abnormal  AFP screen 2018   • Placenta previa antepartum in second trimester 2018   • Uterine fibroid in pregnancy 2018   • Encounter for supervision of other normal pregnancy, second trimester 2018         PLAN:  - S/sx pregnancy and labor warning signs vs general discomforts discussed  - Fetal movements and kick counts reviewed   - Adequate hydration reinforced  - Nutrition/exercise/vitamin education; continued PNV   patient had ultrasound 7 days ago with Dr. Pena along with amniocentesis. Results not yet received. I attempted to cancel ultrasound scheduled here for today as patient had full fetal survey 7 days ago with Dr. Pena. Patient became very angry and refuses to have ultrasound canceled. I expressed she has already had a full fetal survey by the specialist. She stated she will pay for it. She stated she pays 700 dollars per month. I dropped the issue as the patient was not willing to have it canceled to to listen to my explanations.

## 2018-08-09 ENCOUNTER — TELEPHONE (OUTPATIENT)
Dept: OBGYN | Facility: CLINIC | Age: 33
End: 2018-08-09

## 2018-08-09 NOTE — TELEPHONE ENCOUNTER
Pt called requesting amniocentesis results.  Was notified that it goes to process that take up to 2 weeks to get results, then is sent to Dr. Pena, he will review and they will call her w/ results.  We get results after Dr. Pena's office.  Verbalized understanding.

## 2018-08-13 ENCOUNTER — TELEPHONE (OUTPATIENT)
Dept: OBGYN | Facility: CLINIC | Age: 33
End: 2018-08-13

## 2018-08-13 NOTE — TELEPHONE ENCOUNTER
Pt called again requesting amnio results.  Was notified that we don't have those yet, wants to call Dr. Pena's office to find out results. Was notified that this type of testing takes longer that normal result.  Dr. Pena's phone # provided, per pt request.    Not further questions

## 2018-08-14 LAB
TEST NAME 95000: NORMAL
UNCODED TEST RESULT 95040: NORMAL

## 2018-08-21 ENCOUNTER — DOCUMENTATION (OUTPATIENT)
Dept: OBGYN | Facility: CLINIC | Age: 33
End: 2018-08-21

## 2018-09-04 ENCOUNTER — ROUTINE PRENATAL (OUTPATIENT)
Dept: OBGYN | Facility: CLINIC | Age: 33
End: 2018-09-04

## 2018-09-04 VITALS — SYSTOLIC BLOOD PRESSURE: 110 MMHG | DIASTOLIC BLOOD PRESSURE: 68 MMHG | BODY MASS INDEX: 28.71 KG/M2 | WEIGHT: 147 LBS

## 2018-09-04 DIAGNOSIS — Z34.82 ENCOUNTER FOR SUPERVISION OF OTHER NORMAL PREGNANCY, SECOND TRIMESTER: Primary | ICD-10-CM

## 2018-09-04 DIAGNOSIS — D25.9 UTERINE FIBROID IN PREGNANCY: ICD-10-CM

## 2018-09-04 DIAGNOSIS — O34.10 UTERINE FIBROID IN PREGNANCY: ICD-10-CM

## 2018-09-04 DIAGNOSIS — O44.02 PLACENTA PREVIA ANTEPARTUM IN SECOND TRIMESTER: ICD-10-CM

## 2018-09-04 PROCEDURE — 90040 PR PRENATAL FOLLOW UP: CPT | Performed by: NURSE PRACTITIONER

## 2018-09-04 NOTE — PROGRESS NOTES
S) Pt is a 32 y.o.   at 26w1d  gestation. Routine prenatal care today. Pt c/o left sided round ligament pain.  Reassurance provided.    Fetal movement Normal  Cramping no  VB no  LOF no   Denies dysuria. Generally feels well today. Good self-care activities identified. Denies headaches, swelling, visual changes, or epigastric pain .     O) Blood pressure 110/68, weight 66.7 kg (147 lb), last menstrual period 2018.        Labs: given today       PNL: WNL       GCT: given today       AFP:abnormal        GBS: N/A       Pertinent ultrasound - 18 Dr Pena-amniocentisis WNL, US WNL, no previa       TWG 5 lbs    A) IUP at 26w1d       S=D         Patient Active Problem List    Diagnosis Date Noted   • Abnormal  AFP screen 2018   • Placenta previa antepartum in second trimester 2018   • Uterine fibroid in pregnancy 2018   • Encounter for supervision of other normal pregnancy, second trimester 2018          SVE: deferred         TDAP: no       FLU: no        BTL: no       : no       C/S Consent: no       IOL or C/S scheduled: no       LAST PAP: 18 negative         P) s/s ptl vs general discomforts. Fetal movements reviewed. General ed and anticipatory guidance. Nutrition/exercise/vitamin.   Given info on L&D tour.   Encouraged increase protein intake.  3rd trimester lab reqs given.  RTC 2 weeks or PRN.

## 2018-09-04 NOTE — PROGRESS NOTES
OB F/U  Denies VB, LOF, or UC  +FM  Phone#: [phone]  Pharmacy Confirmed.  C/O: Lower abdominal pain  1hr GTT given to patient.

## 2018-09-10 ENCOUNTER — HOSPITAL ENCOUNTER (OUTPATIENT)
Dept: LAB | Facility: MEDICAL CENTER | Age: 33
End: 2018-09-10
Attending: NURSE PRACTITIONER
Payer: COMMERCIAL

## 2018-09-10 DIAGNOSIS — Z34.82 ENCOUNTER FOR SUPERVISION OF OTHER NORMAL PREGNANCY, SECOND TRIMESTER: ICD-10-CM

## 2018-09-10 LAB
GLUCOSE 1H P 50 G GLC PO SERPL-MCNC: 125 MG/DL (ref 70–139)
HCT VFR BLD AUTO: 39 % (ref 37–47)
HGB BLD-MCNC: 13.3 G/DL (ref 12–16)
TREPONEMA PALLIDUM IGG+IGM AB [PRESENCE] IN SERUM OR PLASMA BY IMMUNOASSAY: NON REACTIVE

## 2018-09-17 ENCOUNTER — APPOINTMENT (OUTPATIENT)
Dept: OTHER | Facility: IMAGING CENTER | Age: 33
End: 2018-09-17

## 2018-10-22 ENCOUNTER — ROUTINE PRENATAL (OUTPATIENT)
Dept: OBGYN | Facility: CLINIC | Age: 33
End: 2018-10-22

## 2018-10-22 VITALS — WEIGHT: 155 LBS | BODY MASS INDEX: 30.27 KG/M2 | SYSTOLIC BLOOD PRESSURE: 110 MMHG | DIASTOLIC BLOOD PRESSURE: 64 MMHG

## 2018-10-22 DIAGNOSIS — Z34.82 ENCOUNTER FOR SUPERVISION OF OTHER NORMAL PREGNANCY, SECOND TRIMESTER: ICD-10-CM

## 2018-10-22 PROBLEM — O44.02 PLACENTA PREVIA ANTEPARTUM IN SECOND TRIMESTER: Status: RESOLVED | Noted: 2018-06-11 | Resolved: 2018-10-22

## 2018-10-22 PROCEDURE — 90471 IMMUNIZATION ADMIN: CPT | Performed by: PHYSICIAN ASSISTANT

## 2018-10-22 PROCEDURE — 90715 TDAP VACCINE 7 YRS/> IM: CPT | Performed by: PHYSICIAN ASSISTANT

## 2018-10-22 PROCEDURE — 90686 IIV4 VACC NO PRSV 0.5 ML IM: CPT | Performed by: PHYSICIAN ASSISTANT

## 2018-10-22 PROCEDURE — 90472 IMMUNIZATION ADMIN EACH ADD: CPT | Performed by: PHYSICIAN ASSISTANT

## 2018-10-22 PROCEDURE — 90040 PR PRENATAL FOLLOW UP: CPT | Performed by: PHYSICIAN ASSISTANT

## 2018-10-22 NOTE — PROGRESS NOTES
Pt has no complaints, hasnt had appt in 7 weeks, but had amnio with Dr Pena that was normal. No more US scheduled at this time. Emphasized importance of keeping appts. Pt denies cramping, UCs, Vb, LOF. +FM. Flu and TDap vaccines given today. 1hr GTT, H/H, RPR wnl - pt notified of results. GBS next visit. PTL precautions reviewed. RTC 2 wk or sooner prn.

## 2018-10-22 NOTE — PROGRESS NOTES
Pt here today for OB follow up  Pt states no complaints   Reports +FM  Good # [phone]  Pharmacy Confirmed.  Chaperone offered and not indicated.   Pt given CAROL ANN sheet and instructions.  Pt declines BTL  Pt would like TDAP vaccine.  Pt would like Flu vaccine  Tdap vaccine given. Left Deltoid. VIS given and screening check list reviewed with pt. Verified with KW.  Flu vaccine given. Right Deltoid. VIS given and screening check list reviewed with pt. Verified with KW.

## 2018-10-22 NOTE — LETTER
"Count Your Baby's Movements  Another step to a healthy delivery    Molly Thomasnga             Dept: [phone]    How Many Weeks Pregnant? 33w0d    Date to Begin Counting: 10/22/18                How to use this chart    One way for your physician to keep track of your baby's health is by knowing how often the baby moves (or \"kicks\") in your womb.  You can help your physician to do this by using this chart every day.    Every day, you should see how many hours it takes for your baby to move 10 times.  Start in the morning, as soon as you get up.    · First, write down the time your baby moves until you get to 10.  · Check off one box every time your baby moves until you get to 10.  · Write down the time you finished counting in the last column.  · Total how long it took to count up all 10 movements.  · Finally, fill in the box that shows how long this took.  After counting 10 movements, you no longer have to count any more that day.  The next morning, just start counting again as soon as you get up.    What should you call a \"movement\"?  It is hard to say, because it will feel different from one mother to another and from one pregnancy to the next.  The important thing is that you count the movements the same way throughout your pregnancy.  If you have more questions, you should ask your physician.    Count carefully every day!  SAMPLE:  Week 28    How many hours did it take to feel 10 movements?       Start  Time     1     2     3     4     5     6     7     8     9     10   Finish Time   Mon 8:20 ·  ·  ·  ·  ·  ·  ·  ·  ·  ·  11:40   Tue Wed Thu Fri               Sat               Sun                 IMPORTANT: You should contact your physician if it takes more than two hours for you to feel 10 movements.  Each morning, write down the time and start to count the movements of your baby.  Keep track by checking off one box every time you feel one movement.  When you have " "felt 10 \"kicks\", write down the time you finished counting in the last column.  Then fill in the   box (over the check jeevan) for the number of hours it took.  Be sure to read the complete instructions on the previous page.            "

## 2018-10-22 NOTE — LETTER
Cuente los Movimientos de atwood Bebé  Otro paso importante para la marquis de atwood bebé    Molly Cota     [facility]            Dept: [phone]    ¿Cuántas semanas tiene de embarazo? 33w0d    Fecha cuando tiene que comenzar a contar el movimiento: 10/22/18                    [place] debe usar tarik diagrama    George manera en que atwood doctor puede controlar a marquis de atwood bebé es sabiendo cuantas veces se mueve atwood bebé en el útero, o por medio de las “pataditas”.  Usted podrá ayudarle a atwood médico al usar cada día el siguiente diagrama.    Cada día, usted debe prestar atención a cuantas horas le lleva a atwood bebé moverse 10 veces.  Comience a contar en la mañana, lo antes posible después de haberse levantado.    · Primeramente, escriba la hora en que se mueve atwood bebé, hasta llegar a 10 veces.  · Colóquele un check o palomita a cada cuadrito cada vez que atwood bebé se mueva hasta que complete 10 veces.  · Escriba la hora cuando termine de contar 10 veces en la última columna.  · Sume el total del tiempo que le llevó contar los 10 movimientos.  · Finalmente, complete el cuadrito de cuantas horas le llevó hacerlo.    Después de eleazar contado los 10 movimientos, ya no tendrá que contar los demás movimientos por el tran del día.  A la mañana siguiente, comience a contar de nuevo cuantas veces se mueve el bebé desde el momento en que se levante.    ¿Qué tendría que considerarse un “movimiento”?  Es difícil de decirlo porque es distinto de george madre a otra, y de un embarazo a otro.  Lo importante es que cuente el movimiento de la misma manera miller el transcurso de atwood embarazo.  Si tiene preguntas adicionales, pregúntele a atwood doctor.    ¡Cuente cuidadosamente cada día!     MUESTRA:  Semana 28    ¿Cuántas horas le ha llevado sentir 10 movimientos?        Hora de Inicio     1     2     3     4     5     6     7     8     9     10   Hora de Finlizar   Dustin. 8:20 ·  ·  ·  ·  ·  ·  ·  ·  ·  ·  11:40   Mar.                Mié.               Jue.               Vie.               Sáb.               Dom.                 IMPORTANTE:  Usted debe contactar a atwood doctor si le lleva más de 2 horas sentir 10 movimientos de atwood bebé.    Cada mañana, escriba la hora de inicio y comience a contar los movimientos de atwood bebé.  Hágalo colocándole un check o palomita a cada cuadrito cada vez que sienta un movimiento de atwood bebé.  Cuando haya sentido 10 “pataditas”, escriba la hora en que terminó de contar en la última columna.  Luego, complete en la cajita (arriba de la kalia de check o palomita) el número total de horas que le llevó hacerlo.  Asegúrese de leer completamente las instrucciones en la página anterior.

## 2018-11-05 ENCOUNTER — ROUTINE PRENATAL (OUTPATIENT)
Dept: OBGYN | Facility: CLINIC | Age: 33
End: 2018-11-05

## 2018-11-05 ENCOUNTER — HOSPITAL ENCOUNTER (OUTPATIENT)
Facility: MEDICAL CENTER | Age: 33
End: 2018-11-05
Attending: NURSE PRACTITIONER
Payer: COMMERCIAL

## 2018-11-05 VITALS — SYSTOLIC BLOOD PRESSURE: 104 MMHG | WEIGHT: 157 LBS | BODY MASS INDEX: 30.66 KG/M2 | DIASTOLIC BLOOD PRESSURE: 64 MMHG

## 2018-11-05 DIAGNOSIS — Z3A.35 35 WEEKS GESTATION OF PREGNANCY: ICD-10-CM

## 2018-11-05 PROBLEM — Z34.83 ENCOUNTER FOR SUPERVISION OF OTHER NORMAL PREGNANCY, THIRD TRIMESTER: Status: ACTIVE | Noted: 2018-06-08

## 2018-11-05 PROCEDURE — 90040 PR PRENATAL FOLLOW UP: CPT | Performed by: NURSE PRACTITIONER

## 2018-11-05 NOTE — PROGRESS NOTES
Pt here today for OB follow up  Pt states no complaints   Reports +FM  Good # [phone]  Pharmacy Confirmed.  Chaperone offered and not indicated.   GBS today.

## 2018-11-05 NOTE — PROGRESS NOTES
SUBJECTIVE:  Pt is a 32 y.o.   at 35w0d  gestation. Presents today for follow-up prenatal care. Reports no issues at this time.  Reports good fetal movement. Denies cramping/contractions, bleeding or leaking of fluid. Denies dysuria, headaches, N/V, or other issues at this time. Generally feels well today.     OBJECTIVE:  - See prenatal vitals flow  -   Vitals:    18 1544   BP: 104/64   Weight: 71.2 kg (157 lb)                 ASSESSMENT:   - IUP at 35w0d    - S=D   -   Patient Active Problem List    Diagnosis Date Noted   • Abnormal  AFP screen - Amnio 46XX by Dr Pena 2018   • Uterine fibroid in pregnancy 2018   • Encounter for supervision of other normal pregnancy, third trimester 2018         PLAN:  - S/sx pregnancy and labor warning signs vs general discomforts discussed  - Fetal movements and kick counts reviewed   - Adequate hydration reinforced  - Nutrition/exercise/vitamin education; continued PNV  -Already did tour   - S/p TDAP vacc   - S/p Flu vacc   - GBS collected   - Anticipatory guidance given  - RTC in 1 weeks for follow-up prenatal care

## 2018-11-07 LAB — GP B STREP DNA SPEC QL NAA+PROBE: NEGATIVE

## 2018-11-13 ENCOUNTER — ROUTINE PRENATAL (OUTPATIENT)
Dept: OBGYN | Facility: CLINIC | Age: 33
End: 2018-11-13

## 2018-11-13 VITALS — SYSTOLIC BLOOD PRESSURE: 100 MMHG | DIASTOLIC BLOOD PRESSURE: 60 MMHG | WEIGHT: 161 LBS | BODY MASS INDEX: 31.44 KG/M2

## 2018-11-13 DIAGNOSIS — Z34.83 ENCOUNTER FOR SUPERVISION OF OTHER NORMAL PREGNANCY IN THIRD TRIMESTER: Primary | ICD-10-CM

## 2018-11-13 PROCEDURE — 90040 PR PRENATAL FOLLOW UP: CPT | Performed by: NURSE PRACTITIONER

## 2018-11-13 NOTE — PROGRESS NOTES
OB f/u. + fetal movement.  No VB, LOF or UC's.  Wt:  161lb     BP: 100/60  Good phone # [phone]  Preferred pharmacy confirmed.  GBS negative

## 2018-11-13 NOTE — PROGRESS NOTES
S:  Pt is  at 36w1d here for routine OB follow up.  Reports painful hands and wrists.  Reports good FM.  Denies VB, LOF, RUCs, or vaginal DC.     O:  Please see above vitals.        FHTs: 145        Fundal ht: 35 cm        Fetal position: vertex        SVE: deferred        GBS negative -- reviewed w pt.      A:  IUP at 36w1d  Patient Active Problem List    Diagnosis Date Noted   • Abnormal  AFP screen - Amnio 46XX by Dr Pena 2018   • Uterine fibroid in pregnancy 2018   • Encounter for supervision of other normal pregnancy, third trimester 2018       P:  1.  Continue FKCs.         2.  Labor precautions given.  Instructions given on where to go.  Pt receptive to education.         3.  D/w pt comfort measures for carpal tunnel: heat, wrist braces, Tylenol        4.  Questions answered.         5.  Encouraged adequate water intake       6.  F/u 1wk      .

## 2018-11-20 ENCOUNTER — ROUTINE PRENATAL (OUTPATIENT)
Dept: OBGYN | Facility: CLINIC | Age: 33
End: 2018-11-20

## 2018-11-20 VITALS — WEIGHT: 162 LBS | SYSTOLIC BLOOD PRESSURE: 128 MMHG | DIASTOLIC BLOOD PRESSURE: 78 MMHG | BODY MASS INDEX: 31.64 KG/M2

## 2018-11-20 DIAGNOSIS — O09.893 SUPERVISION OF OTHER HIGH RISK PREGNANCIES, THIRD TRIMESTER: ICD-10-CM

## 2018-11-20 LAB
APPEARANCE UR: NORMAL
BILIRUB UR STRIP-MCNC: NORMAL MG/DL
COLOR UR AUTO: NORMAL
GLUCOSE UR STRIP.AUTO-MCNC: NEGATIVE MG/DL
KETONES UR STRIP.AUTO-MCNC: NEGATIVE MG/DL
LEUKOCYTE ESTERASE UR QL STRIP.AUTO: NEGATIVE
NITRITE UR QL STRIP.AUTO: NEGATIVE
PH UR STRIP.AUTO: 6.5 [PH] (ref 5–8)
PROT UR QL STRIP: NEGATIVE MG/DL
RBC UR QL AUTO: NEGATIVE
SP GR UR STRIP.AUTO: 1.02
UROBILINOGEN UR STRIP-MCNC: NORMAL MG/DL

## 2018-11-20 PROCEDURE — 81002 URINALYSIS NONAUTO W/O SCOPE: CPT | Performed by: NURSE PRACTITIONER

## 2018-11-20 PROCEDURE — 90040 PR PRENATAL FOLLOW UP: CPT | Performed by: NURSE PRACTITIONER

## 2018-11-20 NOTE — PROGRESS NOTES
Pt here today for OB follow up  Pt states very swollen in face, hands and feet  Reports +FM  Good # [phone]  Pharmacy Confirmed.  Chaperone offered and none needed.

## 2018-11-20 NOTE — PROGRESS NOTES
SUBJECTIVE:  Pt is a 33 y.o.   at 37w1d  gestation. Presents today for follow-up prenatal care. Reports no issues at this time.  Reports good fetal movement. Denies cramping/contractions, bleeding or leaking of fluid. Denies dysuria, headaches, N/V, or other issues at this time. Generally feels well today. Is very uncomfortable with carpal tunnel of pregnancy and swelling in feet and lips and has tried all remedies with minimal relief.     OBJECTIVE:  - See prenatal vitals flow  -   Vitals:    18 0833   BP: 128/78   Weight: 73.5 kg (162 lb)                 ASSESSMENT:   - IUP at 37w1d    - S=D   -   Patient Active Problem List    Diagnosis Date Noted   • Abnormal  AFP screen - Amnio 46XX by Dr Pena 2018   • Uterine fibroid in pregnancy 2018   • Encounter for supervision of other normal pregnancy, third trimester 2018         PLAN:  - S/sx pregnancy and labor warning signs vs general discomforts discussed  - Fetal movements and kick counts reviewed   - Adequate hydration reinforced  - Nutrition/exercise/vitamin education; continued PNV  - S/p TDAP vacc   - S/p Flu vacc   - Anticipatory guidance given  - RTC in 1 weeks for follow-up prenatal care

## 2018-11-26 ENCOUNTER — ROUTINE PRENATAL (OUTPATIENT)
Dept: OBGYN | Facility: CLINIC | Age: 33
End: 2018-11-26

## 2018-11-26 VITALS — DIASTOLIC BLOOD PRESSURE: 72 MMHG | WEIGHT: 163 LBS | BODY MASS INDEX: 31.83 KG/M2 | SYSTOLIC BLOOD PRESSURE: 110 MMHG

## 2018-11-26 DIAGNOSIS — Z34.83 ENCOUNTER FOR SUPERVISION OF OTHER NORMAL PREGNANCY, THIRD TRIMESTER: ICD-10-CM

## 2018-11-26 PROCEDURE — 90040 PR PRENATAL FOLLOW UP: CPT | Performed by: NURSE PRACTITIONER

## 2018-11-26 NOTE — PROGRESS NOTES
Pt here today for OB follow up  Pt states having slight cramping    Reports +FM  Good # [phone]  Pharmacy Confirmed.  Chaperone offered and not indicated.   GBS negative.

## 2018-11-26 NOTE — PROGRESS NOTES
SUBJECTIVE:  Pt is a 33 y.o.   at 38w0d  gestation. Presents today for follow-up prenatal care. Reports no issues at this time.  Reports good  fetal movement. Denies cramping/contractions, bleeding or leaking of fluid. Denies dysuria, headaches, N/V, or other issues at this time. Generally feels well today.     OBJECTIVE:  - See prenatal vitals flow  -   Vitals:    18 1444   BP: 110/72   Weight: 73.9 kg (163 lb)     Labs - normal prenatal labs  US - normal fetal survey               ASSESSMENT:   - IUP at 38w0d    - S=D   -   Patient Active Problem List    Diagnosis Date Noted   • Abnormal  AFP screen - Amnio 46XX by Dr Pena 2018   • Uterine fibroid in pregnancy 2018   • Encounter for supervision of other normal pregnancy, third trimester 2018         PLAN:  - S/sx pregnancy and labor warning signs vs general discomforts discussed  - Fetal movements and kick counts reviewed   - Adequate hydration reinforced  - Nutrition/exercise/vitamin education; continued PNV  - Order placed for induction of labor to be scheduled in absence of spontaneous labor.   Patient does not want cervical exam today. States wants cervical exam next week.

## 2018-11-29 ENCOUNTER — HOSPITAL ENCOUNTER (OUTPATIENT)
Facility: MEDICAL CENTER | Age: 33
End: 2018-11-29
Attending: OBSTETRICS & GYNECOLOGY | Admitting: OBSTETRICS & GYNECOLOGY

## 2018-11-29 ENCOUNTER — TELEPHONE (OUTPATIENT)
Dept: OBGYN | Facility: CLINIC | Age: 33
End: 2018-11-29

## 2018-11-29 VITALS — BODY MASS INDEX: 32 KG/M2 | WEIGHT: 163 LBS | HEIGHT: 60 IN

## 2018-11-29 PROCEDURE — 81002 URINALYSIS NONAUTO W/O SCOPE: CPT

## 2018-11-29 PROCEDURE — 59025 FETAL NON-STRESS TEST: CPT

## 2018-11-29 NOTE — TELEPHONE ENCOUNTER
Pt walked in to [facility] c/o R leg being more swollen that the L one, pe denies any HA, vision changes, has not noticed any redness and denies leg feeling hot. Pt also reports feeling tingly toes. Consulted with midwife Patricia Velasco and she recommends patient to go to L&D for further evaluation. Pt notified, verbalized understanding and will comply. Pt had no other questions or concerns

## 2018-11-30 LAB
APPEARANCE UR: CLEAR
COLOR UR AUTO: YELLOW
GLUCOSE UR QL STRIP.AUTO: NEGATIVE MG/DL
KETONES UR QL STRIP.AUTO: NEGATIVE MG/DL
LEUKOCYTE ESTERASE UR QL STRIP.AUTO: NEGATIVE
NITRITE UR QL STRIP.AUTO: NEGATIVE
PH UR STRIP.AUTO: 6.5 [PH]
PROT UR QL STRIP: NEGATIVE MG/DL
RBC UR QL AUTO: NEGATIVE
SP GR UR: 1.01

## 2018-11-30 NOTE — PROGRESS NOTES
[facility] LABOR AND DELIVERY TRIAGE PROGRESS NOTE    PATIENT ID:  NAME:  Molly Cota  MRN:               [id number]  YOB: 1985     33 y.o. female  at 38w3d.    Subjective: Pt presents complaining of swelling in the right ankle starting this morning. Denies pain, is able to ambulate without difficulty.  Pregnancy has been uncomplicated    negative  For CTXS.   negative Feels pain   negative for LOF  negative for vaginal bleeding.   positive for fetal movement    ROS: Patient denies any fever chills, nausea, vomiting, headache, chest pain, shortness of breath, or dysuria or unusual swelling of hands or feet.     Objective:    Vitals:    18 1500   Weight: 73.9 kg (163 lb)   Height: 1.524 m (5')     No data recorded.    General: No acute distress, resting comfortably in bed.  HEENT: normocephalic, nontraumatic, EOMI  Cardiovascular: Heart RRR with no murmurs, rubs or gallops. Distal Pulses 2+  Respiratory: symmetric chest expansion, lungs CTAB, with no wheezes, rales, rhonci  Abdomen: gravid, nontender  Musculoskeletal: strength 5/5 in four extremities. No swelling noted in right leg using tape measure  Neuro: non focal with no numbness, tingling or changes in sensation    Cervix:  not examined  [place]: Uterine Contractions: none seen - mild irritability  FHRM: Baseline 130s, Accels present, no decels, moderate variability    Assessment: 33 y.o. female  at 38w3d    Plan:   1. Discharge home with return precautions for labor/signs/sx of pre-E         Discussed case with DC Perez Attending. Case was discussed and attending agreed with plan prior to discharge of patient.

## 2018-11-30 NOTE — PROGRESS NOTES
33 y.o.  EDC 12/10/18 EGA 38.3 here to LDA3 c/o unilateral swelling to her right ankle. Deneis pain, able to ambulate without difficulty. Pt reports positive fetal movement, denies vaginal bleeding or LOF. Clean catch urine specimen obtained and dipped, WNL. Normal pulses and DTRS. Report to Dr. Gatica. Dr. Gatica at bedside, measured extremities with measuring tape, no difference between the two. REactive NST. Discharge order received. 1710 Discharged to home, after  labor precautions.

## 2018-12-03 ENCOUNTER — ROUTINE PRENATAL (OUTPATIENT)
Dept: OBGYN | Facility: CLINIC | Age: 33
End: 2018-12-03

## 2018-12-03 VITALS — WEIGHT: 163 LBS | BODY MASS INDEX: 31.83 KG/M2 | SYSTOLIC BLOOD PRESSURE: 126 MMHG | DIASTOLIC BLOOD PRESSURE: 72 MMHG

## 2018-12-03 DIAGNOSIS — O36.8190 DECREASED FETAL MOVEMENT DURING PREGNANCY, ANTEPARTUM, SINGLE OR UNSPECIFIED FETUS: ICD-10-CM

## 2018-12-03 DIAGNOSIS — Z34.83 ENCOUNTER FOR SUPERVISION OF OTHER NORMAL PREGNANCY, THIRD TRIMESTER: ICD-10-CM

## 2018-12-03 LAB
NST ACOUSTIC STIMULATION: NORMAL
NST ACTION NECESSARY: NORMAL
NST ASSESSMENT: NORMAL
NST BASELINE: NORMAL
NST INDICATIONS: NORMAL
NST OTHER DATA: NORMAL
NST READ BY: NORMAL
NST RETURN: NORMAL
NST UTERINE ACTIVITY: NORMAL

## 2018-12-03 PROCEDURE — 90040 PR PRENATAL FOLLOW UP: CPT | Performed by: NURSE PRACTITIONER

## 2018-12-03 PROCEDURE — 99999 PR NO CHARGE: CPT | Performed by: NURSE PRACTITIONER

## 2018-12-03 NOTE — PROGRESS NOTES
SUBJECTIVE:  Pt is a 33 y.o.   at 39w0d  gestation. Presents today for follow-up prenatal care. Reports  possible decreased fetal movement. Had a visit recently to L&D for right ankle swelling. Denies cramping/contractions, bleeding or leaking of fluid. Denies dysuria, headaches, N/V, or other issues at this time. Generally feels well today.     OBJECTIVE:  - See prenatal vitals flow  -   Vitals:    18 1516   BP: 126/72   Weight: 73.9 kg (163 lb)      Labs - normal prenatal labs  US - normal fetal survey     Edema is bilateral lower extremity, negative Verena's         ASSESSMENT:   - IUP at 39w0d    - S=D   -   Patient Active Problem List    Diagnosis Date Noted   • Abnormal  AFP screen - Amnio 46XX by Dr Pena 2018   • Uterine fibroid in pregnancy 2018   • Encounter for supervision of other normal pregnancy, third trimester 2018         PLAN:  - S/sx pregnancy and labor warning signs vs general discomforts discussed  - Fetal movements and kick counts reviewed   - Adequate hydration reinforced  - Nutrition/exercise/vitamin education; continued PNV  - Nst now for possible decreased fetal movement  I don't suspect a DVT. This presents a normal lower extremity edema in late gestation.

## 2018-12-03 NOTE — PROGRESS NOTES
Pt here today for OB follow up  NEgative GBs, pt aware  Pt states baby is meeting the 10 movements within the 2 hour time but feeling slower movements.   WT: 163 lb  BP: 126/72  Pt scheduled for IOL on Sunday 12/16/18 at 0900. Pt notified and instructions given to patient.   Pt states she has swelling on her feet, and pelvic pressure and pain.   Good # [phone]

## 2018-12-10 ENCOUNTER — ROUTINE PRENATAL (OUTPATIENT)
Dept: OBGYN | Facility: CLINIC | Age: 33
End: 2018-12-10

## 2018-12-10 VITALS — WEIGHT: 165 LBS | DIASTOLIC BLOOD PRESSURE: 70 MMHG | BODY MASS INDEX: 32.22 KG/M2 | SYSTOLIC BLOOD PRESSURE: 122 MMHG

## 2018-12-10 DIAGNOSIS — Z34.83 ENCOUNTER FOR SUPERVISION OF OTHER NORMAL PREGNANCY IN THIRD TRIMESTER: Primary | ICD-10-CM

## 2018-12-10 PROCEDURE — 90040 PR PRENATAL FOLLOW UP: CPT | Performed by: NURSE PRACTITIONER

## 2018-12-10 NOTE — PROGRESS NOTES
S:  Pt is  at 40w0d here for routine OB follow up.  Reports irreg CTX.  Reports good FM.  Denies VB, LOF, RUCs, or vaginal DC.     O:  Please see above vitals.        FHTs: 143        Fundal ht: 326 cm        Fetal position: vertex        SVE: /2        GBS negative -- reviewed w pt.      A:  IUP at 40w0d  Patient Active Problem List    Diagnosis Date Noted   • Abnormal  AFP screen - Amnio 46XX by Dr Pena 2018   • Uterine fibroid in pregnancy 2018   • Encounter for supervision of other normal pregnancy, third trimester 2018       P:  1.  Continue FKCs.         2.  Labor precautions given.  Instructions given on where to go.  Pt receptive to education.         3.  D/w pt IOL policy.  IOL scheduled.        4.  Questions answered.         5.  Encouraged adequate water intake       6.  F/u at L&D on  for IOL.

## 2018-12-10 NOTE — PROGRESS NOTES
OB f/u. + fetal movement.  No VB, LOF.   c/o UC's every 20 minutes yesterday.  Wt: 165lb        BP: 122/70  Good phone # [phone]  Preferred pharmacy confirmed.

## 2018-12-16 ENCOUNTER — APPOINTMENT (OUTPATIENT)
Dept: OBGYN | Facility: MEDICAL CENTER | Age: 33
End: 2018-12-16
Attending: OBSTETRICS & GYNECOLOGY

## 2018-12-16 ENCOUNTER — HOSPITAL ENCOUNTER (INPATIENT)
Facility: MEDICAL CENTER | Age: 33
LOS: 4 days | End: 2018-12-20
Attending: OBSTETRICS & GYNECOLOGY | Admitting: OBSTETRICS & GYNECOLOGY

## 2018-12-16 LAB
BASOPHILS # BLD AUTO: 0.5 % (ref 0–1.8)
BASOPHILS # BLD: 0.05 K/UL (ref 0–0.12)
EOSINOPHIL # BLD AUTO: 0.06 K/UL (ref 0–0.51)
EOSINOPHIL NFR BLD: 0.6 % (ref 0–6.9)
ERYTHROCYTE [DISTWIDTH] IN BLOOD BY AUTOMATED COUNT: 45.1 FL (ref 35.9–50)
HCT VFR BLD AUTO: 39.3 % (ref 37–47)
HGB BLD-MCNC: 13.2 G/DL (ref 12–16)
HOLDING TUBE BB 8507: NORMAL
IMM GRANULOCYTES # BLD AUTO: 0.04 K/UL (ref 0–0.11)
IMM GRANULOCYTES NFR BLD AUTO: 0.4 % (ref 0–0.9)
LYMPHOCYTES # BLD AUTO: 1.65 K/UL (ref 1–4.8)
LYMPHOCYTES NFR BLD: 16 % (ref 22–41)
MCH RBC QN AUTO: 29.6 PG (ref 27–33)
MCHC RBC AUTO-ENTMCNC: 33.6 G/DL (ref 33.6–35)
MCV RBC AUTO: 88.1 FL (ref 81.4–97.8)
MONOCYTES # BLD AUTO: 0.53 K/UL (ref 0–0.85)
MONOCYTES NFR BLD AUTO: 5.1 % (ref 0–13.4)
NEUTROPHILS # BLD AUTO: 8.01 K/UL (ref 2–7.15)
NEUTROPHILS NFR BLD: 77.4 % (ref 44–72)
NRBC # BLD AUTO: 0 K/UL
NRBC BLD-RTO: 0 /100 WBC
PLATELET # BLD AUTO: 242 K/UL (ref 164–446)
PMV BLD AUTO: 10.5 FL (ref 9–12.9)
RBC # BLD AUTO: 4.46 M/UL (ref 4.2–5.4)
WBC # BLD AUTO: 10.3 K/UL (ref 4.8–10.8)

## 2018-12-16 PROCEDURE — 36415 COLL VENOUS BLD VENIPUNCTURE: CPT

## 2018-12-16 PROCEDURE — A9270 NON-COVERED ITEM OR SERVICE: HCPCS | Performed by: OBSTETRICS & GYNECOLOGY

## 2018-12-16 PROCEDURE — 700102 HCHG RX REV CODE 250 W/ 637 OVERRIDE(OP): Performed by: OBSTETRICS & GYNECOLOGY

## 2018-12-16 PROCEDURE — 3E033VJ INTRODUCTION OF OTHER HORMONE INTO PERIPHERAL VEIN, PERCUTANEOUS APPROACH: ICD-10-PCS | Performed by: OBSTETRICS & GYNECOLOGY

## 2018-12-16 PROCEDURE — 3E0P7VZ INTRODUCTION OF HORMONE INTO FEMALE REPRODUCTIVE, VIA NATURAL OR ARTIFICIAL OPENING: ICD-10-PCS | Performed by: OBSTETRICS & GYNECOLOGY

## 2018-12-16 PROCEDURE — 85025 COMPLETE CBC W/AUTO DIFF WBC: CPT

## 2018-12-16 PROCEDURE — 770002 HCHG ROOM/CARE - OB PRIVATE (112)

## 2018-12-16 RX ORDER — ACETAMINOPHEN 325 MG/1
650 TABLET ORAL EVERY 4 HOURS PRN
Status: CANCELLED | OUTPATIENT
Start: 2018-12-16

## 2018-12-16 RX ORDER — MISOPROSTOL 200 UG/1
800 TABLET ORAL
Status: DISCONTINUED | OUTPATIENT
Start: 2018-12-16 | End: 2018-12-17 | Stop reason: HOSPADM

## 2018-12-16 RX ORDER — ONDANSETRON 2 MG/ML
4 INJECTION INTRAMUSCULAR; INTRAVENOUS EVERY 6 HOURS PRN
Status: CANCELLED | OUTPATIENT
Start: 2018-12-16

## 2018-12-16 RX ORDER — SODIUM CHLORIDE, SODIUM LACTATE, POTASSIUM CHLORIDE, CALCIUM CHLORIDE 600; 310; 30; 20 MG/100ML; MG/100ML; MG/100ML; MG/100ML
INJECTION, SOLUTION INTRAVENOUS CONTINUOUS
Status: DISPENSED | OUTPATIENT
Start: 2018-12-16 | End: 2018-12-16

## 2018-12-16 RX ORDER — ONDANSETRON 4 MG/1
4 TABLET, ORALLY DISINTEGRATING ORAL EVERY 6 HOURS PRN
Status: CANCELLED | OUTPATIENT
Start: 2018-12-16

## 2018-12-16 RX ORDER — HYDROCODONE BITARTRATE AND ACETAMINOPHEN 5; 325 MG/1; MG/1
1 TABLET ORAL EVERY 4 HOURS PRN
Status: CANCELLED | OUTPATIENT
Start: 2018-12-16

## 2018-12-16 RX ORDER — METHYLERGONOVINE MALEATE 0.2 MG/ML
0.2 INJECTION INTRAVENOUS
Status: DISCONTINUED | OUTPATIENT
Start: 2018-12-16 | End: 2018-12-17 | Stop reason: HOSPADM

## 2018-12-16 RX ORDER — IBUPROFEN 600 MG/1
600 TABLET ORAL EVERY 6 HOURS PRN
Status: CANCELLED | OUTPATIENT
Start: 2018-12-16

## 2018-12-16 RX ADMIN — MISOPROSTOL 25 MCG: 100 TABLET ORAL at 11:13

## 2018-12-16 RX ADMIN — MISOPROSTOL 25 MCG: 100 TABLET ORAL at 15:30

## 2018-12-16 ASSESSMENT — PATIENT HEALTH QUESTIONNAIRE - PHQ9
2. FEELING DOWN, DEPRESSED, IRRITABLE, OR HOPELESS: NOT AT ALL
SUM OF ALL RESPONSES TO PHQ9 QUESTIONS 1 AND 2: 0
1. LITTLE INTEREST OR PLEASURE IN DOING THINGS: NOT AT ALL
1. LITTLE INTEREST OR PLEASURE IN DOING THINGS: NOT AT ALL
SUM OF ALL RESPONSES TO PHQ9 QUESTIONS 1 AND 2: 0
2. FEELING DOWN, DEPRESSED, IRRITABLE, OR HOPELESS: NOT AT ALL

## 2018-12-16 ASSESSMENT — COPD QUESTIONNAIRES
IN THE PAST 12 MONTHS DO YOU DO LESS THAN YOU USED TO BECAUSE OF YOUR BREATHING PROBLEMS: DISAGREE/UNSURE
COPD SCREENING SCORE: 0
HAVE YOU SMOKED AT LEAST 100 CIGARETTES IN YOUR ENTIRE LIFE: NO/DON'T KNOW
DURING THE PAST 4 WEEKS HOW MUCH DID YOU FEEL SHORT OF BREATH: NONE/LITTLE OF THE TIME
DO YOU EVER COUGH UP ANY MUCUS OR PHLEGM?: NO/ONLY WITH OCCASIONAL COLDS OR INFECTIONS

## 2018-12-16 ASSESSMENT — LIFESTYLE VARIABLES
EVER_SMOKED: NEVER
ALCOHOL_USE: NO

## 2018-12-16 NOTE — PROGRESS NOTES
[facility]OR AND DELIVERY PROGRESS NOTE    PATIENT ID:  NAME:  Molly Cota  MRN:               [id number]  YOB: 1985     33 y.o. female  at 40w6d.    Subjective: Not feeling contractions yet, just some cramps      Objective:    Vitals:    18 1018   BP: 113/84   Pulse: 88   Temp: 37.3 °C (99.1 °F)   TempSrc: Temporal   Weight: 74.8 kg (165 lb)   Height: 1.524 m (5')       Cervix:  1-2cm/60%/-2  [place]: Uterine Contractions: none, some mild irritability  FHRM: Baseline 135, Accels present, no decels, moderate variability  Pitocin: none yet  Pain control: none yet    Assessment: 33 y.o. female  at 40w6d here for IOL for post date s/p 1 dose cytotect    Plan:   1. Will give a second dose of cytotec, reassess in 4 hours  2. Will start pitocin depending on cervical ripening in 4 hours  3. Anticipate  delivery  4. Pain control as desired.

## 2018-12-16 NOTE — H&P
History and Physical      Molly Cota is a 33 y.o. year old female  at 40w6d dated by LMP who presents for IOL due to post due date. Feeling no symptoms now.    PNC with the [facility]PC starting at 13w4d. Complicated by positive AFP/Quad screen, had anatomy scan done by Dr. Pena which was normal, and karyotyping done which was 46XX    Subjective:   negative  For CTXS.   positive and negative Feels pain   negative for LOF  negative for vaginal bleeding.   positive for fetal movement    ROS: Patient denies fever, chills, nausea, vomiting , headache, visual disturbance, swelling of hands/face and dysuria.    History reviewed. No pertinent past medical history.  History reviewed. No pertinent surgical history.  OB History    Para Term  AB Living   2 1 1     1   SAB TAB Ectopic Molar Multiple Live Births             1      # Outcome Date GA Lbr Hira/2nd Weight Sex Delivery Anes PTL Lv   2 Current            1 Term 04 40w0d  2.722 kg (6 lb) M Vag-Spont  N HOLLIS      Birth Comments: No complications        GYN History:  History of irregular heavy periods for the last 7-8 years. Denies history of fibroids  Social History     Social History   • Marital status:      Spouse name: N/A   • Number of children: N/A   • Years of education: N/A     Occupational History   • Not on file.     Social History Main Topics   • Smoking status: Former Smoker     Packs/day: 0.10     Types: Cigarettes     Quit date: 2018   • Smokeless tobacco: Never Used   • Alcohol use No   • Drug use: No   • Sexual activity: Yes     Partners: Male     Other Topics Concern   • Not on file     Social History Narrative   • No narrative on file     Allergies: Patient has no known allergies.  No current facility-administered medications on file prior to encounter.      Current Outpatient Prescriptions on File Prior to Encounter   Medication Sig Dispense Refill   • Prenatal Multivit-Min-Fe-FA (PRENATAL VITAMINS PO)  Take  by mouth.           Objective:      Blood pressure 113/84, pulse 88, temperature 37.3 °C (99.1 °F), temperature source Temporal, height 1.524 m (5'), weight 74.8 kg (165 lb), last menstrual period 2018.    General: No acute distress, resting comfortably in bed.  HEENT: normocephalic, nontraumatic, EOMI  Abdomen: gravid, nontender  Musculoskeletal: strength 5/5 in four extremities  Neuro: non focal with no numbness, tingling or changes in sensation  EFW: 3300g  [place]: Uterine Contractions 0-1 q 20 minutes.   FHRM: Baseline 145, Accels present, no decels, moderate variability  Presentation: cephalic  Cervix:  1cm/50%/-2      Lab Review  Lab:   Blood type: O pos   HBsAg: non-reactive   HIV: non-reactive   GC: negative   Chlamydia: negative    Rubella: immune    GBS: neg  1 hr GTT: 125    Recent Labs      18   0941   ABOGROUP  O   RUBELLAIGG  >500.0   HEPBSAG  Negative       Recent Labs      18   1110   WBC  10.3   RBC  4.46   HEMOGLOBIN  13.2   HEMATOCRIT  39.3   MCV  88.1   MCH  29.6   RDW  45.1   PLATELETCT  242   MPV  10.5   NEUTSPOLYS  77.40*   LYMPHOCYTES  16.00*   MONOCYTES  5.10   EOSINOPHILS  0.60   BASOPHILS  0.50     No results for input(s): SODIUM, POTASSIUM, CHLORIDE, CO2, GLUCOSE, BUN, CPKTOTAL in the last 72 hours.        Assessment/Plan:   Molly Cota is a 33 y.o. year old female  at 40w6d dated by LMP who presents for IOL due to post due date      - Admit to L&D  - Anticipate   - IOL: start with 1 dose Cytotec, will reassess after 4 hours and start pitocin if cervix ripened.  - GBS negative, PNL wnl, O+      OB Attending Addendum:    I have seen Ms. Molly Cota and agree w/ documentation by Dr. Gatica.  Pt is a 33 y.o.yo  @ 40w6d admitted for postdates IOL.  Pregnancy complicated by +QS for DS however had normal amniocentesis w/ Dr. Oki.  Doing well today, SVE by me /- so plan for cervical ripening with cytotec then  pit/AROM.  EFW 3200g, cat I FHTs.      Bisi Dorantes MD  [facility], Women's Health

## 2018-12-16 NOTE — CARE PLAN
Problem: Pain  Goal: Alleviation of Pain or a reduction in pain to the patient's comfort goal    Intervention: Pain Management - Epidural/Spinal  Pt educated on the availability of an epidural for pain  Intervention: Pain Management--Medications  Pt educated on medications available for pain      Problem: Infection  Goal: Will remain free from infection    Intervention: Assess signs and symptoms of infection  Pt is free from s/s of infection  Intervention: Implement standard precautions and perform hand washing before and after patient contact  Hand hygiene performed before and after pt contact

## 2018-12-16 NOTE — PROGRESS NOTES
1015: Pt presents to L&D for scheduled IOL. Pt is a  at 40.6 weeks gestation. POC discussed, pt and family verbalized understanding.

## 2018-12-17 LAB
ERYTHROCYTE [DISTWIDTH] IN BLOOD BY AUTOMATED COUNT: 45.5 FL (ref 35.9–50)
HCT VFR BLD AUTO: 35.9 % (ref 37–47)
HGB BLD-MCNC: 12.2 G/DL (ref 12–16)
MCH RBC QN AUTO: 30 PG (ref 27–33)
MCHC RBC AUTO-ENTMCNC: 34 G/DL (ref 33.6–35)
MCV RBC AUTO: 88.2 FL (ref 81.4–97.8)
PLATELET # BLD AUTO: 212 K/UL (ref 164–446)
PMV BLD AUTO: 10.4 FL (ref 9–12.9)
RBC # BLD AUTO: 4.07 M/UL (ref 4.2–5.4)
WBC # BLD AUTO: 24.2 K/UL (ref 4.8–10.8)

## 2018-12-17 PROCEDURE — 4A1H74Z MONITORING OF PRODUCTS OF CONCEPTION, CARDIAC ELECTRICAL ACTIVITY, VIA NATURAL OR ARTIFICIAL OPENING: ICD-10-PCS | Performed by: OBSTETRICS & GYNECOLOGY

## 2018-12-17 PROCEDURE — 36415 COLL VENOUS BLD VENIPUNCTURE: CPT

## 2018-12-17 PROCEDURE — 700111 HCHG RX REV CODE 636 W/ 250 OVERRIDE (IP)

## 2018-12-17 PROCEDURE — 770002 HCHG ROOM/CARE - OB PRIVATE (112)

## 2018-12-17 PROCEDURE — 59514 CESAREAN DELIVERY ONLY: CPT

## 2018-12-17 PROCEDURE — 59514 CESAREAN DELIVERY ONLY: CPT | Performed by: OBSTETRICS & GYNECOLOGY

## 2018-12-17 PROCEDURE — 305385 HCHG SURGICAL SERVICES 1/4 HOUR: Performed by: OBSTETRICS & GYNECOLOGY

## 2018-12-17 PROCEDURE — 700105 HCHG RX REV CODE 258

## 2018-12-17 PROCEDURE — 85027 COMPLETE CBC AUTOMATED: CPT

## 2018-12-17 PROCEDURE — 304966 HCHG RECOVERY SVSC TIME ADDL 1/2 HR: Performed by: OBSTETRICS & GYNECOLOGY

## 2018-12-17 PROCEDURE — 10907ZC DRAINAGE OF AMNIOTIC FLUID, THERAPEUTIC FROM PRODUCTS OF CONCEPTION, VIA NATURAL OR ARTIFICIAL OPENING: ICD-10-PCS | Performed by: OBSTETRICS & GYNECOLOGY

## 2018-12-17 PROCEDURE — 10H07YZ INSERTION OF OTHER DEVICE INTO PRODUCTS OF CONCEPTION, VIA NATURAL OR ARTIFICIAL OPENING: ICD-10-PCS | Performed by: OBSTETRICS & GYNECOLOGY

## 2018-12-17 PROCEDURE — 700105 HCHG RX REV CODE 258: Performed by: OBSTETRICS & GYNECOLOGY

## 2018-12-17 PROCEDURE — 700111 HCHG RX REV CODE 636 W/ 250 OVERRIDE (IP): Performed by: ANESTHESIOLOGY

## 2018-12-17 PROCEDURE — 700102 HCHG RX REV CODE 250 W/ 637 OVERRIDE(OP)

## 2018-12-17 PROCEDURE — 304964 HCHG RECOVERY ROOM TIME 1HR: Performed by: OBSTETRICS & GYNECOLOGY

## 2018-12-17 PROCEDURE — 306828 HCHG ANES-TIME GENERAL: Performed by: OBSTETRICS & GYNECOLOGY

## 2018-12-17 PROCEDURE — 700102 HCHG RX REV CODE 250 W/ 637 OVERRIDE(OP): Performed by: ANESTHESIOLOGY

## 2018-12-17 PROCEDURE — 700111 HCHG RX REV CODE 636 W/ 250 OVERRIDE (IP): Performed by: OBSTETRICS & GYNECOLOGY

## 2018-12-17 PROCEDURE — A9270 NON-COVERED ITEM OR SERVICE: HCPCS | Performed by: ANESTHESIOLOGY

## 2018-12-17 PROCEDURE — 700111 HCHG RX REV CODE 636 W/ 250 OVERRIDE (IP): Performed by: NURSE PRACTITIONER

## 2018-12-17 RX ORDER — METOCLOPRAMIDE HYDROCHLORIDE 5 MG/ML
10 INJECTION INTRAMUSCULAR; INTRAVENOUS ONCE
Status: COMPLETED | OUTPATIENT
Start: 2018-12-17 | End: 2018-12-17

## 2018-12-17 RX ORDER — ONDANSETRON 2 MG/ML
4 INJECTION INTRAMUSCULAR; INTRAVENOUS EVERY 6 HOURS PRN
Status: DISPENSED | OUTPATIENT
Start: 2018-12-17 | End: 2018-12-18

## 2018-12-17 RX ORDER — HYDROMORPHONE HYDROCHLORIDE 1 MG/ML
0.4 INJECTION, SOLUTION INTRAMUSCULAR; INTRAVENOUS; SUBCUTANEOUS
Status: ACTIVE | OUTPATIENT
Start: 2018-12-17 | End: 2018-12-18

## 2018-12-17 RX ORDER — MEPERIDINE HYDROCHLORIDE 25 MG/ML
12.5 INJECTION INTRAMUSCULAR; INTRAVENOUS; SUBCUTANEOUS
Status: DISCONTINUED | OUTPATIENT
Start: 2018-12-17 | End: 2018-12-17 | Stop reason: HOSPADM

## 2018-12-17 RX ORDER — HALOPERIDOL 5 MG/ML
1 INJECTION INTRAMUSCULAR
Status: DISCONTINUED | OUTPATIENT
Start: 2018-12-17 | End: 2018-12-17 | Stop reason: HOSPADM

## 2018-12-17 RX ORDER — HYDRALAZINE HYDROCHLORIDE 20 MG/ML
5 INJECTION INTRAMUSCULAR; INTRAVENOUS
Status: DISCONTINUED | OUTPATIENT
Start: 2018-12-17 | End: 2018-12-17 | Stop reason: HOSPADM

## 2018-12-17 RX ORDER — DIPHENHYDRAMINE HYDROCHLORIDE 50 MG/ML
12.5 INJECTION INTRAMUSCULAR; INTRAVENOUS EVERY 6 HOURS PRN
Status: CANCELLED | OUTPATIENT
Start: 2018-12-17 | End: 2018-12-18

## 2018-12-17 RX ORDER — METOPROLOL TARTRATE 1 MG/ML
1 INJECTION, SOLUTION INTRAVENOUS
Status: DISCONTINUED | OUTPATIENT
Start: 2018-12-17 | End: 2018-12-17 | Stop reason: HOSPADM

## 2018-12-17 RX ORDER — SODIUM CHLORIDE, SODIUM LACTATE, POTASSIUM CHLORIDE, CALCIUM CHLORIDE 600; 310; 30; 20 MG/100ML; MG/100ML; MG/100ML; MG/100ML
INJECTION, SOLUTION INTRAVENOUS
Status: DISCONTINUED
Start: 2018-12-17 | End: 2018-12-17

## 2018-12-17 RX ORDER — ONDANSETRON 2 MG/ML
4 INJECTION INTRAMUSCULAR; INTRAVENOUS
Status: DISCONTINUED | OUTPATIENT
Start: 2018-12-17 | End: 2018-12-17 | Stop reason: HOSPADM

## 2018-12-17 RX ORDER — SODIUM CHLORIDE, SODIUM LACTATE, POTASSIUM CHLORIDE, CALCIUM CHLORIDE 600; 310; 30; 20 MG/100ML; MG/100ML; MG/100ML; MG/100ML
INJECTION, SOLUTION INTRAVENOUS
Status: COMPLETED
Start: 2018-12-17 | End: 2018-12-17

## 2018-12-17 RX ORDER — LORAZEPAM 2 MG/ML
0.5 INJECTION INTRAMUSCULAR
Status: DISCONTINUED | OUTPATIENT
Start: 2018-12-17 | End: 2018-12-17 | Stop reason: HOSPADM

## 2018-12-17 RX ORDER — CITRIC ACID/SODIUM CITRATE 334-500MG
30 SOLUTION, ORAL ORAL ONCE
Status: COMPLETED | OUTPATIENT
Start: 2018-12-17 | End: 2018-12-17

## 2018-12-17 RX ORDER — KETOROLAC TROMETHAMINE 30 MG/ML
30 INJECTION, SOLUTION INTRAMUSCULAR; INTRAVENOUS EVERY 6 HOURS
Status: COMPLETED | OUTPATIENT
Start: 2018-12-17 | End: 2018-12-18

## 2018-12-17 RX ORDER — DIPHENHYDRAMINE HYDROCHLORIDE 50 MG/ML
25 INJECTION INTRAMUSCULAR; INTRAVENOUS EVERY 6 HOURS PRN
Status: ACTIVE | OUTPATIENT
Start: 2018-12-17 | End: 2018-12-18

## 2018-12-17 RX ORDER — OXYCODONE HYDROCHLORIDE 5 MG/1
5 TABLET ORAL EVERY 4 HOURS PRN
Status: CANCELLED | OUTPATIENT
Start: 2018-12-17 | End: 2018-12-18

## 2018-12-17 RX ORDER — VITAMIN A ACETATE, BETA CAROTENE, ASCORBIC ACID, CHOLECALCIFEROL, .ALPHA.-TOCOPHEROL ACETATE, DL-, THIAMINE MONONITRATE, RIBOFLAVIN, NIACINAMIDE, PYRIDOXINE HYDROCHLORIDE, FOLIC ACID, CYANOCOBALAMIN, CALCIUM CARBONATE, FERROUS FUMARATE, ZINC OXIDE, CUPRIC OXIDE 3080; 12; 120; 400; 1; 1.84; 3; 20; 22; 920; 25; 200; 27; 10; 2 [IU]/1; UG/1; MG/1; [IU]/1; MG/1; MG/1; MG/1; MG/1; MG/1; [IU]/1; MG/1; MG/1; MG/1; MG/1; MG/1
1 TABLET, FILM COATED ORAL EVERY MORNING
Status: DISCONTINUED | OUTPATIENT
Start: 2018-12-17 | End: 2018-12-17

## 2018-12-17 RX ORDER — IBUPROFEN 600 MG/1
600 TABLET ORAL EVERY 6 HOURS PRN
Status: DISCONTINUED | OUTPATIENT
Start: 2018-12-17 | End: 2018-12-17

## 2018-12-17 RX ORDER — OXYCODONE HCL 5 MG/5 ML
5 SOLUTION, ORAL ORAL
Status: DISCONTINUED | OUTPATIENT
Start: 2018-12-17 | End: 2018-12-17 | Stop reason: HOSPADM

## 2018-12-17 RX ORDER — HYDROMORPHONE HYDROCHLORIDE 1 MG/ML
0.2 INJECTION, SOLUTION INTRAMUSCULAR; INTRAVENOUS; SUBCUTANEOUS
Status: CANCELLED | OUTPATIENT
Start: 2018-12-17 | End: 2018-12-18

## 2018-12-17 RX ORDER — HYDROMORPHONE HYDROCHLORIDE 1 MG/ML
0.4 INJECTION, SOLUTION INTRAMUSCULAR; INTRAVENOUS; SUBCUTANEOUS
Status: CANCELLED | OUTPATIENT
Start: 2018-12-17 | End: 2018-12-18

## 2018-12-17 RX ORDER — OXYCODONE HYDROCHLORIDE 10 MG/1
10 TABLET ORAL EVERY 4 HOURS PRN
Status: ACTIVE | OUTPATIENT
Start: 2018-12-17 | End: 2018-12-18

## 2018-12-17 RX ORDER — HYDROMORPHONE HYDROCHLORIDE 1 MG/ML
0.2 INJECTION, SOLUTION INTRAMUSCULAR; INTRAVENOUS; SUBCUTANEOUS
Status: ACTIVE | OUTPATIENT
Start: 2018-12-17 | End: 2018-12-18

## 2018-12-17 RX ORDER — BISACODYL 10 MG
10 SUPPOSITORY, RECTAL RECTAL PRN
Status: DISCONTINUED | OUTPATIENT
Start: 2018-12-17 | End: 2018-12-17

## 2018-12-17 RX ORDER — OXYCODONE HCL 5 MG/5 ML
10 SOLUTION, ORAL ORAL
Status: DISCONTINUED | OUTPATIENT
Start: 2018-12-17 | End: 2018-12-17 | Stop reason: HOSPADM

## 2018-12-17 RX ORDER — DIPHENHYDRAMINE HYDROCHLORIDE 50 MG/ML
12.5 INJECTION INTRAMUSCULAR; INTRAVENOUS EVERY 6 HOURS PRN
Status: ACTIVE | OUTPATIENT
Start: 2018-12-17 | End: 2018-12-18

## 2018-12-17 RX ORDER — HYDROMORPHONE HYDROCHLORIDE 1 MG/ML
0.2 INJECTION, SOLUTION INTRAMUSCULAR; INTRAVENOUS; SUBCUTANEOUS
Status: DISCONTINUED | OUTPATIENT
Start: 2018-12-17 | End: 2018-12-17 | Stop reason: HOSPADM

## 2018-12-17 RX ORDER — TERBUTALINE SULFATE 1 MG/ML
INJECTION, SOLUTION SUBCUTANEOUS
Status: COMPLETED
Start: 2018-12-17 | End: 2018-12-17

## 2018-12-17 RX ORDER — CITRIC ACID/SODIUM CITRATE 334-500MG
SOLUTION, ORAL ORAL
Status: COMPLETED
Start: 2018-12-17 | End: 2018-12-17

## 2018-12-17 RX ORDER — ACETAMINOPHEN 500 MG
1000 TABLET ORAL EVERY 6 HOURS
Status: COMPLETED | OUTPATIENT
Start: 2018-12-17 | End: 2018-12-18

## 2018-12-17 RX ORDER — SODIUM CHLORIDE, SODIUM LACTATE, POTASSIUM CHLORIDE, CALCIUM CHLORIDE 600; 310; 30; 20 MG/100ML; MG/100ML; MG/100ML; MG/100ML
INJECTION, SOLUTION INTRAVENOUS PRN
Status: DISCONTINUED | OUTPATIENT
Start: 2018-12-17 | End: 2018-12-20 | Stop reason: HOSPADM

## 2018-12-17 RX ORDER — OXYCODONE HYDROCHLORIDE 5 MG/1
5 TABLET ORAL EVERY 4 HOURS PRN
Status: ACTIVE | OUTPATIENT
Start: 2018-12-17 | End: 2018-12-18

## 2018-12-17 RX ORDER — ACETAMINOPHEN 500 MG
500 TABLET ORAL EVERY 6 HOURS PRN
Status: DISCONTINUED | OUTPATIENT
Start: 2018-12-17 | End: 2018-12-17

## 2018-12-17 RX ORDER — DIPHENHYDRAMINE HYDROCHLORIDE 50 MG/ML
12.5 INJECTION INTRAMUSCULAR; INTRAVENOUS
Status: DISCONTINUED | OUTPATIENT
Start: 2018-12-17 | End: 2018-12-17 | Stop reason: HOSPADM

## 2018-12-17 RX ORDER — SIMETHICONE 80 MG
80 TABLET,CHEWABLE ORAL 4 TIMES DAILY PRN
Status: DISCONTINUED | OUTPATIENT
Start: 2018-12-17 | End: 2018-12-20 | Stop reason: HOSPADM

## 2018-12-17 RX ORDER — SODIUM CHLORIDE, SODIUM GLUCONATE, SODIUM ACETATE, POTASSIUM CHLORIDE AND MAGNESIUM CHLORIDE 526; 502; 368; 37; 30 MG/100ML; MG/100ML; MG/100ML; MG/100ML; MG/100ML
1500 INJECTION, SOLUTION INTRAVENOUS ONCE
Status: COMPLETED | OUTPATIENT
Start: 2018-12-17 | End: 2018-12-17

## 2018-12-17 RX ORDER — SODIUM CHLORIDE, SODIUM GLUCONATE, SODIUM ACETATE, POTASSIUM CHLORIDE AND MAGNESIUM CHLORIDE 526; 502; 368; 37; 30 MG/100ML; MG/100ML; MG/100ML; MG/100ML; MG/100ML
INJECTION, SOLUTION INTRAVENOUS
Status: COMPLETED
Start: 2018-12-17 | End: 2018-12-17

## 2018-12-17 RX ORDER — KETOROLAC TROMETHAMINE 30 MG/ML
30 INJECTION, SOLUTION INTRAMUSCULAR; INTRAVENOUS EVERY 6 HOURS
Status: CANCELLED | OUTPATIENT
Start: 2018-12-17 | End: 2018-12-18

## 2018-12-17 RX ORDER — DOCUSATE SODIUM 100 MG/1
100 CAPSULE, LIQUID FILLED ORAL 2 TIMES DAILY PRN
Status: DISCONTINUED | OUTPATIENT
Start: 2018-12-17 | End: 2018-12-17

## 2018-12-17 RX ORDER — METOCLOPRAMIDE HYDROCHLORIDE 5 MG/ML
INJECTION INTRAMUSCULAR; INTRAVENOUS
Status: COMPLETED
Start: 2018-12-17 | End: 2018-12-17

## 2018-12-17 RX ORDER — ACETAMINOPHEN 500 MG
1000 TABLET ORAL EVERY 6 HOURS
Status: CANCELLED | OUTPATIENT
Start: 2018-12-17 | End: 2018-12-18

## 2018-12-17 RX ORDER — LIDOCAINE HYDROCHLORIDE 10 MG/ML
INJECTION, SOLUTION INFILTRATION; PERINEURAL
Status: COMPLETED
Start: 2018-12-17 | End: 2018-12-17

## 2018-12-17 RX ORDER — HYDROMORPHONE HYDROCHLORIDE 1 MG/ML
0.1 INJECTION, SOLUTION INTRAMUSCULAR; INTRAVENOUS; SUBCUTANEOUS
Status: DISCONTINUED | OUTPATIENT
Start: 2018-12-17 | End: 2018-12-17 | Stop reason: HOSPADM

## 2018-12-17 RX ORDER — DIPHENHYDRAMINE HYDROCHLORIDE 50 MG/ML
25 INJECTION INTRAMUSCULAR; INTRAVENOUS EVERY 6 HOURS PRN
Status: CANCELLED | OUTPATIENT
Start: 2018-12-17 | End: 2018-12-18

## 2018-12-17 RX ORDER — OXYCODONE HYDROCHLORIDE 10 MG/1
10 TABLET ORAL EVERY 4 HOURS PRN
Status: CANCELLED | OUTPATIENT
Start: 2018-12-17 | End: 2018-12-18

## 2018-12-17 RX ORDER — SODIUM CHLORIDE, SODIUM LACTATE, POTASSIUM CHLORIDE, CALCIUM CHLORIDE 600; 310; 30; 20 MG/100ML; MG/100ML; MG/100ML; MG/100ML
INJECTION, SOLUTION INTRAVENOUS PRN
Status: DISCONTINUED | OUTPATIENT
Start: 2018-12-17 | End: 2018-12-17

## 2018-12-17 RX ORDER — HYDROMORPHONE HYDROCHLORIDE 1 MG/ML
0.4 INJECTION, SOLUTION INTRAMUSCULAR; INTRAVENOUS; SUBCUTANEOUS
Status: DISCONTINUED | OUTPATIENT
Start: 2018-12-17 | End: 2018-12-17 | Stop reason: HOSPADM

## 2018-12-17 RX ORDER — ONDANSETRON 2 MG/ML
4 INJECTION INTRAMUSCULAR; INTRAVENOUS EVERY 6 HOURS PRN
Status: CANCELLED | OUTPATIENT
Start: 2018-12-17 | End: 2018-12-18

## 2018-12-17 RX ADMIN — METOCLOPRAMIDE 10 MG: 5 INJECTION, SOLUTION INTRAMUSCULAR; INTRAVENOUS at 06:57

## 2018-12-17 RX ADMIN — ACETAMINOPHEN 1000 MG: 500 TABLET ORAL at 17:05

## 2018-12-17 RX ADMIN — ONDANSETRON 4 MG: 2 INJECTION INTRAMUSCULAR; INTRAVENOUS at 13:03

## 2018-12-17 RX ADMIN — SODIUM CHLORIDE, POTASSIUM CHLORIDE, SODIUM LACTATE AND CALCIUM CHLORIDE: 600; 310; 30; 20 INJECTION, SOLUTION INTRAVENOUS at 13:47

## 2018-12-17 RX ADMIN — KETOROLAC TROMETHAMINE 30 MG: 30 INJECTION, SOLUTION INTRAMUSCULAR at 21:30

## 2018-12-17 RX ADMIN — TERBUTALINE SULFATE 0.25 MG: 1 INJECTION, SOLUTION SUBCUTANEOUS at 07:01

## 2018-12-17 RX ADMIN — METOCLOPRAMIDE HYDROCHLORIDE 10 MG: 5 INJECTION INTRAMUSCULAR; INTRAVENOUS at 06:57

## 2018-12-17 RX ADMIN — SODIUM CHLORIDE, SODIUM GLUCONATE, SODIUM ACETATE, POTASSIUM CHLORIDE AND MAGNESIUM CHLORIDE 1500 ML: 526; 502; 368; 37; 30 INJECTION, SOLUTION INTRAVENOUS at 06:56

## 2018-12-17 RX ADMIN — KETOROLAC TROMETHAMINE 30 MG: 30 INJECTION, SOLUTION INTRAMUSCULAR at 14:26

## 2018-12-17 RX ADMIN — FAMOTIDINE 20 MG: 10 INJECTION INTRAVENOUS at 06:56

## 2018-12-17 RX ADMIN — ACETAMINOPHEN 1000 MG: 500 TABLET ORAL at 10:37

## 2018-12-17 RX ADMIN — Medication 1 MILLI-UNITS/MIN: at 02:56

## 2018-12-17 RX ADMIN — SODIUM CITRATE AND CITRIC ACID MONOHYDRATE 30 ML: 500; 334 SOLUTION ORAL at 06:57

## 2018-12-17 RX ADMIN — Medication 30 ML: at 06:57

## 2018-12-17 RX ADMIN — ACETAMINOPHEN 1000 MG: 500 TABLET ORAL at 22:58

## 2018-12-17 RX ADMIN — FENTANYL CITRATE 100 MCG: 50 INJECTION, SOLUTION INTRAMUSCULAR; INTRAVENOUS at 00:27

## 2018-12-17 RX ADMIN — SODIUM CHLORIDE, POTASSIUM CHLORIDE, SODIUM LACTATE AND CALCIUM CHLORIDE 1000 ML: 600; 310; 30; 20 INJECTION, SOLUTION INTRAVENOUS at 02:24

## 2018-12-17 ASSESSMENT — EDINBURGH POSTNATAL DEPRESSION SCALE (EPDS)
I HAVE FELT SCARED OR PANICKY FOR NO GOOD REASON: NO, NOT AT ALL
I HAVE BEEN ANXIOUS OR WORRIED FOR NO GOOD REASON: YES, SOMETIMES
I HAVE BLAMED MYSELF UNNECESSARILY WHEN THINGS WENT WRONG: NOT VERY OFTEN
I HAVE BEEN SO UNHAPPY THAT I HAVE HAD DIFFICULTY SLEEPING: NOT AT ALL
I HAVE BEEN ABLE TO LAUGH AND SEE THE FUNNY SIDE OF THINGS: AS MUCH AS I ALWAYS COULD
I HAVE LOOKED FORWARD WITH ENJOYMENT TO THINGS: AS MUCH AS I EVER DID
THE THOUGHT OF HARMING MYSELF HAS OCCURRED TO ME: NEVER
I HAVE FELT SAD OR MISERABLE: NO, NOT AT ALL
I HAVE BEEN SO UNHAPPY THAT I HAVE BEEN CRYING: NO, NEVER
THINGS HAVE BEEN GETTING ON TOP OF ME: NO, MOST OF THE TIME I HAVE COPED QUITE WELL

## 2018-12-17 ASSESSMENT — PAIN SCALES - GENERAL
PAINLEVEL_OUTOF10: 8
PAINLEVEL_OUTOF10: 0
PAINLEVEL_OUTOF10: 0
PAINLEVEL_OUTOF10: 3
PAINLEVEL_OUTOF10: 0
PAINLEVEL_OUTOF10: 1
PAINLEVEL_OUTOF10: 1
PAINLEVEL_OUTOF10: 0
PAINLEVEL_OUTOF10: 1
PAINLEVEL_OUTOF10: 0
PAINLEVEL_OUTOF10: 2
PAINLEVEL_OUTOF10: 0

## 2018-12-17 ASSESSMENT — PATIENT HEALTH QUESTIONNAIRE - PHQ9
SUM OF ALL RESPONSES TO PHQ9 QUESTIONS 1 AND 2: 0
2. FEELING DOWN, DEPRESSED, IRRITABLE, OR HOPELESS: NOT AT ALL
1. LITTLE INTEREST OR PLEASURE IN DOING THINGS: NOT AT ALL

## 2018-12-17 NOTE — PROGRESS NOTES
PATIENT ID:  NAME:  Molly Cota  MRN:               [id number]  YOB: 1985    Subjective:   Pt resting in bed after fentanyl dose, reports cramping as light.     Objective:    Vitals:    18 1939 18 0030 18 0031 18 0033   BP: 137/86   124/77   Pulse: 67   75   Temp:  37.8 °C (100 °F) 37.1 °C (98.7 °F)    TempSrc:  Temporal Oral    Weight:       Height:           Cervix:  difficult exam due to pt intolerance, 6-7cm/80%/-2, AROM @ 01:45 ?light mec, dark bloody mucous   [place]: Uterine Contractions Q 2-3 minutes.   FHRM: Baseline 140 , moderate variability, Accels present, three variable decels noted       Assessment:   33 y.o. female  at 41w0d.  GBS 3200  Vertex     Plan:   1. LR for IV hydration  2. Pitocin augmentation as needed  3. Continued position changes   4. IUPC placed for fetal heart tracing as it is related to unclear contractions on toco  5. Continuous fetal heart rate and maternal monitoring  6. Anticipate

## 2018-12-17 NOTE — CARE PLAN
Problem: Pain  Goal: Alleviation of Pain or a reduction in pain to the patient's comfort goal  Assessing and monitoring patient's pain and implementing pharmacologic and nonpharmacologic means of pain management as needed. Educating patient concerning pain management options such as the epidural and fentanyl    Problem: Risk for Infection, Impaired Wound Healing  Goal: Remain free from signs and symptoms of infection  Assessing and monitoring patient for s/s of infection and implementing precautions as needed. Educating patient and family concerning importance of hand hygiene especially when it comes to handling the baby

## 2018-12-17 NOTE — PROGRESS NOTES
PATIENT ID:  NAME:  Molly Cota  MRN:               [id number]  YOB: 1985    Subjective:   Pt reports light menstrual cramping that she feels comes every five minutes. Up on birth ball before and now resting in bed.     Objective:    Vitals:    18 1018 18 1700 18 1920   BP: 113/84 129/79    Pulse: 88 63    Temp: 37.3 °C (99.1 °F)  37.1 °C (98.7 °F)   TempSrc: Temporal  Temporal   Weight: 74.8 kg (165 lb)     Height: 1.524 m (5')         Cervix:  2cm/60%/-2, intact membranes  [place]: Uterine Contractions Q 2-4 minutes.   FHRM: Baseline 125, moderate variability, Accels present, no decels       Assessment:   33 y.o. female  at 40w6d.  EFW: 3200  GBS negative  Vertex     Plan:   1. LR for IV hydration  2. Cook balloon placed 19:50  3. Pain management options discussed with patient; does not desire to use epidural   4. Continuous fetal heart rate and maternal monitoring  5. Anticipate

## 2018-12-17 NOTE — OR SURGEON
Immediate Post OP Note    PreOp Diagnosis:   1. SIUP @ 41w0d  2. Fetal intolerance of labor    PostOp Diagnosis:   S/p primary LTCS    Procedure(s):  PRIMARY C SECTION - Wound Class: Clean Contaminated    Surgeon(s):  Bisi Dorantes M.D.    Anesthesiologist/Type of Anesthesia:  Anesthesiologist: Liya Maxwell M.D./Spinal    Surgical Staff:  Circulator: Manasa Watts R.N.  Scrub Person: Sirisha Narayanan  L&ABDIAS Circulator Assistant: Leeann Guerrero R.N.  L&ABDIAS Baby  Nurse: Ayana Quiroga R.N.    Specimens removed if any:  * No specimens in log *    Estimated Blood Loss: 600  IVF: 600cc LR  UOP: 75cc    Findings: viable female infant, APGARs 8/9, wt 2805g    Complications: none        12/17/2018 8:12 AM Bisi Dorantes M.D.

## 2018-12-17 NOTE — PROGRESS NOTES
0700 - Report from EULALIO Mora RN. Pt being prepped for c/s called by Dr. Dorantes.   0704 - Pt transferred to OR 2 via hospital bed in stable condition  0753 - Pt transferred to PACU bed 3 via gurney in stable condition  0855 - Pt transferred to S 319 in stable condition, infant in arms. FOB at side with belongings. Report to BENJA Portillo

## 2018-12-17 NOTE — PROGRESS NOTES
Brief Progress Note:    Pt with runs of recurrent late decerations, typically resolving with position change and IVF bolus.  Pit was at 1 however off secondary to decelerations.  FHTs otherwise reassuring with moderate variability.  Pt without cervical change since 430 exam, MVUs adequate currently on own mechanism. FSE to be placed now by CNM for improved tracing with maternal position changes..  Discussed w/ pt that if her late decelerations continue may have to proceed with  delivery for fetal intolerance of labor.  Will continue to expectantly manage now and cont to monitor FHTs.      Bisi Dorantes MD  [facility], Women[facility]      Pt with continued recurrent late decelerations, no cervical change on repeat SVE.  Will proceed with primary LTCS for fetal intolerance of labor.  With Bulgarian video interpetore, discussed w/ pt risks of surgery including pain, bleeding, infection, risk of damage to intraabdominal structures including bowel/bladder/ureters.  Pt confirms she is accepting of blood transfusion in case of emergency.  Reviewed risks of transfusion including transfusion reaction (fever, damage to heart/lungs/kidneys), risk of exposure to infectious disease including HIV and hepatitis.  All questions answered.  Consent signed

## 2018-12-17 NOTE — PROGRESS NOTES
1900) Bedside report received from PRIMO Thomas RN, POC discussed at bedside, safety precautions in place, CLIP  1920) PRIMO Bazzi CNM at bedside, SVE - see doc flowsheets  1950) Cervical ripening balloon inserted by PRIMO Bazzi CNM at bedside, 50 ml of sterile water placed in uterine side and 80 ml of sterile water placed in vaginal side.   0045) Report to PRIMO Bazzi CNM, will plan to remove cervical ripening balloon at 0150  0144) PRIMO Bazzi CNM at bedside, cervical ripening balloon removed, AROM - light meconium stained fluid, SVE - see doc flowsheets  0256) Orders received to start pitocin, patient educated concerning pitocin augmentation and verbalizes understanding, pitocin started at this time  0333) SVE - see doc flowsheets  0410) Telephone report to PRIMO Bazzi CNM concerning variable decelerations, orders received for an amnioinfusion, will implement at this time  0430) Telephone report to PRIMO Bazzi CNM, provider reviewing tracing at this time  0432) Patient having a prolonged deceleration, FHT down to 70s lasting 180 seconds total, FHT back to baseline at 0435. Positions changed during deceleration, 10L of O2 on via face mask, IVF bolusing at this time, PRIMO Bazzi CNM at bedside, SVE remains unchanged.   0511) Pitocin restarted per PRIMO Bazzi CNM  0532) Pitocin stopped due to repetitive late decelerations  0600) PRIMO Bazzi CNM at bedside, SVE - remains unchanged  0607) PRIMO Bazzi CNM at bedside still at bedside, patient now having a prolonged deceleration lasting approximately 120 seconds, patient repositioned into hands and knees, 10L of O2 still on via face mask, FHT back to baseline.  0624) FSE placed by PRIMO Bazzi CNM to closely monitor FHT during decelerations  0632) Patient still having recurrent late decelerations, PRIMO Bazzi CNM called to bedside  0648)  section called by Dr. Dorantes at bedside,  used to translate in order to obtain consent  0650) Dr. Medellin at bedside to  consent with  iPAD  0700) Bedside report to KAYLI Watts RN, POC discussed, patient to get back to OR as soon as possible. Patient prepped for  section, education provided and patient verbalizes understanding.  0701) Terbutaline administered per MD order

## 2018-12-17 NOTE — OP REPORT
Operative Report  Date of Service: 12/17/18      PreOp Diagnosis:   1Bharat BAEZ @ 41w0d  2. Fetal intolerance of labor    PostOp Diagnosis:   S/p primary LTCS    Procedure(s):  PRIMARY C SECTION - Wound Class: Clean Contaminated    Surgeon(s):  Bisi Dorantes M.D.    Anesthesiologist/Type of Anesthesia:  Anesthesiologist: Liya Maxwell M.D./Spinal    Surgical Staff:  Circulator: Manasa Watts R.N.  Scrub Person: Sirisha Narayanan  L&D Circulator Assistant: Leeann Guerrero R.N.  L&D Baby  Nurse: Ayana Quiroga R.N.    Specimens removed if any:  * No specimens in log *    Estimated Blood Loss: 600  IVF: 600cc LR  UOP: 75cc    Findings: viable female infant, APGARs 8/9, wt 2805g    Complications: none      Principal Procedure As Follows:  The pt ws taken to the operating room where spinal anesthesia was placed and found to be adequate.  The patient was prepped and draped in a normal supine position with a wedge under the right hip.  A pfannensteil incision was made and taken down to the fascia with the scalpel, which was incised bilaterally with the scalpel.  The fascial incision was extended laterally with the Booker scissors and blunt dissection.  The rectus muscles were  in the midline and the peritoneum was entered bluntly and  extended with stretching.  The bladder blade was placed.  The lower uterine segment was incised transversely and the endometrial cavity entered with a blunt finger.  The hysterotomy was extended with cephalad-caudad stretching.  The infant's head was manually elevated to the level of the hysterotomy and delivered, followed atraumatically by the anterior shoulder, posterior shoulder and body with help of fundal pressure.  The infant was stimulated, bulb suctioned, and the cord was clamped x2, cut and the infant handed to nurse.  Cord gases were sent.  The placenta delivered with gentle cord traction and uterine massage and was noted to be intact with 3-vessel  cord. The uterus was exteriorized, cleared of all clots and debris and closed in 2 layers with 0-monocryl.  The uterus was returned to the abdomen, the gutters cleared of all clots and debris and the hysterotomy re-examined and noted to be hemostatic.  The rectus muscles were inspected, found to be hemostatic.  The fascia was closed with 0 Vicryl.  The subcutaneous tissues was irrigated. Bleeders were coagulated with the bovie.  The subcutaneous tissues were approximated with running 2-0 plain and the skin with subcuticular suture with 4-0 monocryl.  Dermabond was placed, let dry and followed by pressure dressing.  All counts were correct x3.  Pt and infant went to recovery in good condition.      Meena Dorantes M.D.

## 2018-12-18 PROCEDURE — 700102 HCHG RX REV CODE 250 W/ 637 OVERRIDE(OP): Performed by: OBSTETRICS & GYNECOLOGY

## 2018-12-18 PROCEDURE — 700111 HCHG RX REV CODE 636 W/ 250 OVERRIDE (IP): Performed by: ANESTHESIOLOGY

## 2018-12-18 PROCEDURE — A9270 NON-COVERED ITEM OR SERVICE: HCPCS | Performed by: OBSTETRICS & GYNECOLOGY

## 2018-12-18 PROCEDURE — 770002 HCHG ROOM/CARE - OB PRIVATE (112)

## 2018-12-18 PROCEDURE — 700102 HCHG RX REV CODE 250 W/ 637 OVERRIDE(OP): Performed by: ANESTHESIOLOGY

## 2018-12-18 PROCEDURE — A9270 NON-COVERED ITEM OR SERVICE: HCPCS | Performed by: ANESTHESIOLOGY

## 2018-12-18 RX ORDER — OXYCODONE HYDROCHLORIDE 5 MG/1
5 TABLET ORAL EVERY 4 HOURS PRN
Status: DISCONTINUED | OUTPATIENT
Start: 2018-12-18 | End: 2018-12-20 | Stop reason: HOSPADM

## 2018-12-18 RX ORDER — HALOPERIDOL 5 MG/ML
1 INJECTION INTRAMUSCULAR
Status: DISCONTINUED | OUTPATIENT
Start: 2018-12-18 | End: 2018-12-18

## 2018-12-18 RX ORDER — DIPHENHYDRAMINE HYDROCHLORIDE 50 MG/ML
12.5 INJECTION INTRAMUSCULAR; INTRAVENOUS
Status: DISCONTINUED | OUTPATIENT
Start: 2018-12-18 | End: 2018-12-18

## 2018-12-18 RX ORDER — ONDANSETRON 2 MG/ML
4 INJECTION INTRAMUSCULAR; INTRAVENOUS
Status: DISCONTINUED | OUTPATIENT
Start: 2018-12-18 | End: 2018-12-18

## 2018-12-18 RX ORDER — IBUPROFEN 600 MG/1
600 TABLET ORAL EVERY 6 HOURS PRN
Status: DISCONTINUED | OUTPATIENT
Start: 2018-12-18 | End: 2018-12-20 | Stop reason: HOSPADM

## 2018-12-18 RX ORDER — OXYCODONE HYDROCHLORIDE 10 MG/1
10 TABLET ORAL EVERY 4 HOURS PRN
Status: DISCONTINUED | OUTPATIENT
Start: 2018-12-18 | End: 2018-12-20 | Stop reason: HOSPADM

## 2018-12-18 RX ORDER — KETOROLAC TROMETHAMINE 30 MG/ML
30 INJECTION, SOLUTION INTRAMUSCULAR; INTRAVENOUS EVERY 6 HOURS
Status: DISCONTINUED | OUTPATIENT
Start: 2018-12-18 | End: 2018-12-18

## 2018-12-18 RX ADMIN — OXYCODONE HYDROCHLORIDE 5 MG: 5 TABLET ORAL at 10:19

## 2018-12-18 RX ADMIN — OXYCODONE HYDROCHLORIDE 5 MG: 5 TABLET ORAL at 15:00

## 2018-12-18 RX ADMIN — IBUPROFEN 600 MG: 600 TABLET, FILM COATED ORAL at 10:19

## 2018-12-18 RX ADMIN — ACETAMINOPHEN 1000 MG: 500 TABLET ORAL at 05:15

## 2018-12-18 RX ADMIN — IBUPROFEN 600 MG: 600 TABLET, FILM COATED ORAL at 17:02

## 2018-12-18 RX ADMIN — KETOROLAC TROMETHAMINE 30 MG: 30 INJECTION, SOLUTION INTRAMUSCULAR at 03:27

## 2018-12-18 RX ADMIN — OXYCODONE HYDROCHLORIDE 5 MG: 5 TABLET ORAL at 19:42

## 2018-12-18 ASSESSMENT — PAIN SCALES - GENERAL
PAINLEVEL_OUTOF10: 0
PAINLEVEL_OUTOF10: 2
PAINLEVEL_OUTOF10: 5
PAINLEVEL_OUTOF10: 0
PAINLEVEL_OUTOF10: 0
PAINLEVEL_OUTOF10: 2
PAINLEVEL_OUTOF10: 1
PAINLEVEL_OUTOF10: 5
PAINLEVEL_OUTOF10: 5
PAINLEVEL_OUTOF10: 1

## 2018-12-18 ASSESSMENT — PATIENT HEALTH QUESTIONNAIRE - PHQ9
2. FEELING DOWN, DEPRESSED, IRRITABLE, OR HOPELESS: NOT AT ALL
1. LITTLE INTEREST OR PLEASURE IN DOING THINGS: NOT AT ALL
SUM OF ALL RESPONSES TO PHQ9 QUESTIONS 1 AND 2: 0

## 2018-12-18 NOTE — LACTATION NOTE
This note was copied from a baby's chart.  Physical assessment of baby and mother provided. Introduction to basics of initiating breastfeeding shown at this time to include posture, angle of latch, skin to skin and normal  feeding patterns and expectations.

## 2018-12-18 NOTE — PROGRESS NOTES
Pt assessment complete, wnl. Fundus firm at umbilicus with minimal discharge.  Pt ambulating and voiding without difficulty. Bonding well with infant, pt breast feeding well. Plan of care discussed with patient for the day. Questions answered. Encouraged to call with needs, will monitor.

## 2018-12-18 NOTE — CARE PLAN
Problem: Altered physiologic condition related to postoperative  delivery  Goal: Patient physiologically stable as evidenced by normal lochia, palpable uterine involution and vital signs within normal limits  Outcome: PROGRESSING AS EXPECTED  Fundus firm at umbilicus with light lochia.    Problem: Potential for postpartum infection related to surgical incision, compromised uterine condition, urinary tract or respiratory compromise  Goal: Patient will be afebrile and free from signs and symptoms of infection  Outcome: PROGRESSING AS EXPECTED  Afebrile with incision clean,dry and intact.    Problem: Alteration in comfort related to surgical incision and/or after birth pains  Goal: Patient is able to ambulate, care for self and infant with acceptable pain level  Outcome: PROGRESSING AS EXPECTED    Goal: Patient verbalizes acceptable pain level  Outcome: PROGRESSING AS EXPECTED  Verbalizes acceptable pain relief with pain medication being given asordered.    Problem: Potential knowledge deficit related to lack of understanding of self and  care  Goal: Patient will demonstrate ability to care for self and infant  Outcome: PROGRESSING AS EXPECTED  Patient is breast feeding independently and bonding well with infant.

## 2018-12-18 NOTE — PROGRESS NOTES
Post Partum Progress Note    Name:   Molly Cota   Date/Time:  2018 - 6:20 AM  Chief Admitting Dx:  Pregnancy  IOL  Labor and delivery indication for care or intervention  Delivery Type:   for fetal distress  Post-Op/Post Partum Days #:  1    Subjective:  Abdominal pain: no  Ambulating:   yes  Tolerating liquids:  yes  Tolerating food:  yes common adult  Flatus:   no  BM:    no  Bleeding:   without any bleeding  Voiding:   No- jason removed at 0500  Dizziness:   no  Feeding:   breast    Vitals:    18 2200 18 2255 18 0000 18 0400   BP:   112/63 114/68   Pulse: 67 68 75 65   Resp:    Temp:   36.4 °C (97.5 °F) 36.6 °C (97.8 °F)   TempSrc:   Temporal Temporal   SpO2: 97% 98% 98% 97%   Weight:       Height:           Exam:  Breast: Tenderness no, Engorged no and Lactating yes  Abdomen: Abdomen soft, non-tender. BS normal. No masses,  No organomegaly  Fundal Tenderness:  no  Fundus Firm: yes  Incision: dry and intact  Below umbilicus: yes  Perineum: perineum intact  Lochia: mild  Extremities: Normal, no cyanosis, clubbing, trace extremities, peripheral pulses and reflexes normal, no edema, redness or tenderness in the calves or thighs, Homans sign is negative, no sign of DVT, feet normal, good pulses, normal color, temperature and sensation    Meds:  Current Facility-Administered Medications   Medication Dose   • LR infusion     • simethicone (MYLICON) chewable tab 80 mg  80 mg   • oxytocin (PITOCIN) 20 UNITS/1000ML LR (postpartum)   mL/hr   • oxyCODONE immediate-release (ROXICODONE) tablet 5 mg  5 mg   • oxyCODONE immediate release (ROXICODONE) tablet 10 mg  10 mg   • HYDROmorphone pf (DILAUDID) injection 0.2 mg  0.2 mg   • HYDROmorphone pf (DILAUDID) injection 0.4 mg  0.4 mg   • ondansetron (ZOFRAN) syringe/vial injection 4 mg  4 mg   • diphenhydrAMINE (BENADRYL) injection 12.5 mg  12.5 mg   • naloxone (NARCAN) 0.4 mg in NS 1,000 mL infusion   0.4 mg   • diphenhydrAMINE (BENADRYL) injection 12.5 mg  12.5 mg    Or   • diphenhydrAMINE (BENADRYL) injection 25 mg  25 mg    Or   • naloxone (NARCAN) 0.4 mg in NS 1,000 mL infusion  0.4 mg       Labs:   Recent Labs      18   1110  18   1513   WBC  10.3  24.2*   RBC  4.46  4.07*   HEMOGLOBIN  13.2  12.2   HEMATOCRIT  39.3  35.9*   MCV  88.1  88.2   MCH  29.6  30.0   MCHC  33.6  34.0   RDW  45.1  45.5   PLATELETCT  242  212   MPV  10.5  10.4       Assessment:  Chief Admitting Dx:  Pregnancy  IOL  Labor and delivery indication for care or intervention  Delivery Type:   for fetal distress  Tubal Ligation:  no    Plan:  Continue routine post partum care.  Anticipate discharge POD#3    Brianna Stein C.N.M.

## 2018-12-19 PROCEDURE — 770002 HCHG ROOM/CARE - OB PRIVATE (112)

## 2018-12-19 PROCEDURE — A9270 NON-COVERED ITEM OR SERVICE: HCPCS | Performed by: OBSTETRICS & GYNECOLOGY

## 2018-12-19 PROCEDURE — 700102 HCHG RX REV CODE 250 W/ 637 OVERRIDE(OP): Performed by: OBSTETRICS & GYNECOLOGY

## 2018-12-19 RX ADMIN — OXYCODONE HYDROCHLORIDE 5 MG: 5 TABLET ORAL at 13:42

## 2018-12-19 RX ADMIN — IBUPROFEN 600 MG: 600 TABLET, FILM COATED ORAL at 20:32

## 2018-12-19 RX ADMIN — IBUPROFEN 600 MG: 600 TABLET, FILM COATED ORAL at 13:42

## 2018-12-19 RX ADMIN — IBUPROFEN 600 MG: 600 TABLET, FILM COATED ORAL at 07:21

## 2018-12-19 RX ADMIN — OXYCODONE HYDROCHLORIDE 10 MG: 10 TABLET ORAL at 00:19

## 2018-12-19 RX ADMIN — OXYCODONE HYDROCHLORIDE 10 MG: 10 TABLET ORAL at 07:21

## 2018-12-19 RX ADMIN — IBUPROFEN 600 MG: 600 TABLET, FILM COATED ORAL at 00:19

## 2018-12-19 RX ADMIN — OXYCODONE HYDROCHLORIDE 10 MG: 10 TABLET ORAL at 20:32

## 2018-12-19 ASSESSMENT — PAIN SCALES - GENERAL
PAINLEVEL_OUTOF10: 8
PAINLEVEL_OUTOF10: 0
PAINLEVEL_OUTOF10: 5
PAINLEVEL_OUTOF10: 1
PAINLEVEL_OUTOF10: 8

## 2018-12-19 NOTE — CARE PLAN
Problem: Potential knowledge deficit related to lack of understanding of self and  care  Goal: Patient will verbalize understanding of self and infant care  Pt able to care for self and infant.

## 2018-12-19 NOTE — PROGRESS NOTES
Post Partum Progress Note    Name:   Molly Cota   Date/Time:  2018 - 6:26 AM  Chief Admitting Dx:  Pregnancy  IOL  Labor and delivery indication for care or intervention  Delivery Type:   for fetal distress  Post-Op/Post Partum Days #:  2    Subjective:  Abdominal pain: no  Ambulating:   yes  Tolerating liquids:  yes  Tolerating food:  yes common adult  Flatus:   yes  BM:    no  Bleeding:   without any bleeding  Voiding:   yes  Dizziness:   no  Feeding:   breast    Vitals:    18 0000 18 0400 18 0800 18   BP: 112/63 114/68 104/59 121/79   Pulse: 75 65 79 76   Resp:    Temp: 36.4 °C (97.5 °F) 36.6 °C (97.8 °F) 36.6 °C (97.9 °F) 37.2 °C (99 °F)   TempSrc: Temporal Temporal Temporal Temporal   SpO2: 98% 97% 94% 96%   Weight:       Height:           Exam:  Breast: Tenderness no  Abdomen: Abdomen soft, non-tender. BS normal. No masses,  No organomegaly  Fundal Tenderness:  no  Fundus Firm: yes  Incision: dry and intact  Below umbilicus: yes  Perineum: perineum intact  Lochia: mild  Extremities: Normal extremities, peripheral pulses and reflexes normal    Meds:  Current Facility-Administered Medications   Medication Dose   • ibuprofen (MOTRIN) tablet 600 mg  600 mg   • oxyCODONE immediate-release (ROXICODONE) tablet 5 mg  5 mg   • oxyCODONE immediate release (ROXICODONE) tablet 10 mg  10 mg   • LR infusion     • simethicone (MYLICON) chewable tab 80 mg  80 mg   • oxytocin (PITOCIN) 20 UNITS/1000ML LR (postpartum)   mL/hr       Labs:   Recent Labs      18   1110  18   1513   WBC  10.3  24.2*   RBC  4.46  4.07*   HEMOGLOBIN  13.2  12.2   HEMATOCRIT  39.3  35.9*   MCV  88.1  88.2   MCH  29.6  30.0   MCHC  33.6  34.0   RDW  45.1  45.5   PLATELETCT  242  212   MPV  10.5  10.4       Assessment:  Chief Admitting Dx:  Pregnancy  IOL  Labor and delivery indication for care or intervention  Delivery Type:   for fetal  distress  Tubal Ligation:  no    Plan:  Continue routine post partum care.  Plan for discharge on POD#3    PRINCE Pace.

## 2018-12-19 NOTE — CONSULTS
Baby had weight loss of 9.5% last evening, was cluster feeding and had a temp of 100.7.  RN initiated hand expression and pumping and baby feed 20 ml. donor milk. Spoke with mom and baby vigorously nursed this am and drank another 20ml. donor milk. Mom pumped approx. 3 ml colostrum and fed back to baby. Mom denies discomfort during breastfeeding. Mom is comfortable with the feeding plan for today and is also feeling  positive since she feels her breasts are starting to feel heavier.

## 2018-12-19 NOTE — PROGRESS NOTES
Assessment complete. Fundus firm, lochia light. VSS. Tolerating diet. Voiding without difficulty. Incision opem to air. Pt states passing gas. Pain controlled with prn medications per mar. Will offer pain medications as they become available. FOB at bedside, bonding with pt/baby. POC discussed with pt. Encouraged to call with needs. Call light in place.

## 2018-12-20 VITALS
OXYGEN SATURATION: 97 % | SYSTOLIC BLOOD PRESSURE: 106 MMHG | HEIGHT: 60 IN | HEART RATE: 71 BPM | TEMPERATURE: 98.7 F | BODY MASS INDEX: 32.39 KG/M2 | WEIGHT: 165 LBS | RESPIRATION RATE: 18 BRPM | DIASTOLIC BLOOD PRESSURE: 64 MMHG

## 2018-12-20 PROCEDURE — A9270 NON-COVERED ITEM OR SERVICE: HCPCS | Performed by: OBSTETRICS & GYNECOLOGY

## 2018-12-20 PROCEDURE — 700102 HCHG RX REV CODE 250 W/ 637 OVERRIDE(OP): Performed by: OBSTETRICS & GYNECOLOGY

## 2018-12-20 RX ORDER — DOCUSATE SODIUM 100 MG/1
100 CAPSULE, LIQUID FILLED ORAL 2 TIMES DAILY
Qty: 60 CAP | Refills: 5 | Status: SHIPPED | OUTPATIENT
Start: 2018-12-20 | End: 2021-08-13

## 2018-12-20 RX ORDER — OXYCODONE HYDROCHLORIDE 5 MG/1
5 TABLET ORAL EVERY 4 HOURS PRN
Qty: 15 TAB | Refills: 0 | Status: SHIPPED | OUTPATIENT
Start: 2018-12-20 | End: 2018-12-27

## 2018-12-20 RX ADMIN — IBUPROFEN 600 MG: 600 TABLET, FILM COATED ORAL at 19:38

## 2018-12-20 RX ADMIN — OXYCODONE HYDROCHLORIDE 10 MG: 10 TABLET ORAL at 10:05

## 2018-12-20 RX ADMIN — IBUPROFEN 600 MG: 600 TABLET, FILM COATED ORAL at 03:05

## 2018-12-20 RX ADMIN — IBUPROFEN 600 MG: 600 TABLET, FILM COATED ORAL at 10:05

## 2018-12-20 RX ADMIN — OXYCODONE HYDROCHLORIDE 5 MG: 5 TABLET ORAL at 03:05

## 2018-12-20 RX ADMIN — OXYCODONE HYDROCHLORIDE 5 MG: 5 TABLET ORAL at 19:38

## 2018-12-20 ASSESSMENT — PAIN SCALES - GENERAL
PAINLEVEL_OUTOF10: 1
PAINLEVEL_OUTOF10: 0
PAINLEVEL_OUTOF10: 7
PAINLEVEL_OUTOF10: 5
PAINLEVEL_OUTOF10: 0
PAINLEVEL_OUTOF10: 0
PAINLEVEL_OUTOF10: 5
PAINLEVEL_OUTOF10: 0

## 2018-12-20 NOTE — DISCHARGE SUMMARY
Discharge Summary:      Molly Cota      Admit Date:   2018  Discharge Date:  2018     Admitting diagnosis:  Pregnancy  IOL  Labor and delivery indication for care or intervention  Discharge Diagnosis: Status post pLTCS for fetal distress  Pregnancy Complications: positive AFP/Quad screen, had anatomy scan done by Dr. Pena which was normal, and karyotyping done which was 46XX  Tubal Ligation:  no        History:  History reviewed. No pertinent past medical history.  OB History    Para Term  AB Living   2 2 2     2   SAB TAB Ectopic Molar Multiple Live Births           0 2      # Outcome Date GA Lbr Hira/2nd Weight Sex Delivery Anes PTL Lv   2 Term 18 41w0d  2.805 kg (6 lb 2.9 oz) F CS-LTranv Spinal N HOLLIS      Complications: Fetal Intolerance   1 Term 04 40w0d  2.722 kg (6 lb) M Vag-Spont  N HOLLIS      Birth Comments: No complications           Patient has no known allergies.  Patient Active Problem List    Diagnosis Date Noted   • Abnormal  AFP screen - Amnio 46XX by Dr Pena 2018   • Uterine fibroid in pregnancy 2018   • Encounter for supervision of other normal pregnancy, third trimester 2018        Hospital Course:   33 y.o.  now , was admitted with the above mentioned diagnosis, underwent pLTCS for fetal distress. Patient postpartum course was unremarkable, with progressive advancement in diet, ambulation and toleration of oral analgesia. Patient without complaints today and desires discharge.      Abdominal pain: mild  Ambulating: yes  tolerating liquids: yes  tolerating regular diet: yes  Flatus: yes  BM: yes  Bleeding: no  Voiding: yes  Dizziness: no  Breast feeding: yes  Breast tenderness: yes    Vitals:    18 0800 18 0756 18   BP: 104/59 121/79 117/79 134/91   Pulse: 79 76 71 79   Resp:    Temp: 36.6 °C (97.9 °F) 37.2 °C (99 °F) 36.8 °C (98.2 °F) 37.3 °C (99.1 °F)    TempSrc: Temporal Temporal Temporal Temporal   SpO2: 94% 96% 98% 98%   Weight:       Height:           Current Facility-Administered Medications   Medication Dose   • ibuprofen (MOTRIN) tablet 600 mg  600 mg   • oxyCODONE immediate-release (ROXICODONE) tablet 5 mg  5 mg   • oxyCODONE immediate release (ROXICODONE) tablet 10 mg  10 mg   • LR infusion     • simethicone (MYLICON) chewable tab 80 mg  80 mg   • oxytocin (PITOCIN) 20 UNITS/1000ML LR (postpartum)   mL/hr       Exam:  Vitals:    12/18/18 0800 12/18/18 2000 12/19/18 0756 12/19/18 2000   BP: 104/59 121/79 117/79 134/91   Pulse: 79 76 71 79   Resp: 20 16 18 18   Temp: 36.6 °C (97.9 °F) 37.2 °C (99 °F) 36.8 °C (98.2 °F) 37.3 °C (99.1 °F)   TempSrc: Temporal Temporal Temporal Temporal   SpO2: 94% 96% 98% 98%   Weight:       Height:         General: No acute distress, resting comfortably in bed.  HEENT: normocephalic, nontraumatic, EOMI  Cardiovascular: Heart RRR with no murmurs, rubs or gallops. Distal Pulses 2+  Respiratory: symmetric chest expansion, lungs CTA bilaterally with no wheezes rales or rhonci  Abdomen: soft, mildly tender around incision which is clean, dry and intact, fundus firm, +BS  Genitourinary: lochia light, denies excessive vaginal bleeding  Musculoskeletal: strength 5/5 in four extremities, no calf tenderness  Neuro: no focal deficits noted       Labs:  Recent Labs      12/17/18   1513   WBC  24.2*   RBC  4.07*   HEMOGLOBIN  12.2   HEMATOCRIT  35.9*   MCV  88.2   MCH  30.0   MCHC  34.0   RDW  45.5   PLATELETCT  212   MPV  10.4        Activity:   Discharge to home  Pelvic Rest x 6 weeks    Assessment:  normal postpartum course     Follow up: .  [facility] or [facility] in 5 weeks for post partum follow up; 1 week for incision check.   To resume daily PNV and iron supplement if needed with hydration.     Patient to return to [facility] or ER if any of the following occur:   Fever over 100.5   Severe abdominal pain   Red streaks or  painful masses in the breasts   Foul smelling discharge or lochia   Heavy vaginal bleeding saturating a pad per hour   S/s of PP depression     Discharge Meds:      Medication List      ASK your doctor about these medications      Instructions   PRENATAL VITAMINS PO   Take  by mouth.            Glen Garrett M.D.

## 2018-12-20 NOTE — CARE PLAN
Problem: Alteration in comfort related to surgical incision and/or after birth pains  Goal: Patient verbalizes acceptable pain level  Outcome: PROGRESSING AS EXPECTED  Pt is voicing her pain level; she also is asking for medications. Will continue to reassess her pain.     Problem: Potential knowledge deficit related to lack of understanding of self and  care  Goal: Patient will verbalize understanding of self and infant care  Outcome: PROGRESSING AS EXPECTED  Pt verbally demonstrates understanding of caring for infant and self. Pt makes sure to feed infant on scheduled time, will supplement with donor milk when necessary. Effective bonding. MOB will voice any concerns or questions.

## 2018-12-20 NOTE — PROGRESS NOTES
Assumed patient care. Assessment is done. Incision looks clean, dry, and intact. There is no drainage present. Pt denies any pain at this time. Encouraged patient to use call light for any needs if needed. All questions answered. MOB bonding well with infant.

## 2018-12-20 NOTE — DISCHARGE INSTRUCTIONS
POSTPARTUM DISCHARGE INSTRUCTIONS FOR MOM    YOB: 1985   Age: 33 y.o.               Admit Date: 2018     Discharge Date: 2018  Attending Doctor:  MINOO Joseph*                  Allergies:  Patient has no known allergies.    Discharged to home by car. Discharged via wheelchair, hospital escort: Yes.  Special equipment needed: Not Applicable  Belongings with: Personal  Be sure to schedule a follow-up appointment with your primary care doctor or any specialists as instructed.     Discharge Plan:   Diet Plan: (P) Discussed  Activity Level: (P) Discussed  Confirmed Follow up Appointment: (P) Appointment Scheduled  Confirmed Symptoms Management: (P) Discussed  Influenza Vaccine Indication: (P) Not indicated: Previously immunized this influenza season and > 8 years of age    REASONS TO CALL YOUR OBSTETRICIAN:  1.   Persistent fever or shaking chills (Temperature higher than 100.4)  2.   Heavy bleeding (soaking more than 1 pad per hour); Passing clots  3.   Foul odor from vagina  4.   Mastitis (Breast infection; breast pain, chills, fever, redness)  5.   Urinary pain, burning or frequency  6.   Episiotomy infection  7.   Abdominal incision infection  8.   Severe depression longer than 24 hours    HAND WASHING  · Prior to handling the baby.  · Before breastfeeding or bottle feeding baby.  · After using the bathroom or changing the baby's diaper.    WOUND CARE  Ask your physician for additional care instructions.  In general:    ·  Incision:      · Keep clean and dry.    · Do NOT lift anything heavier than your baby for up to 6 weeks.    · There should not be any opening or pus.      VAGINAL CARE  · Nothing inside vagina for 6 weeks: no sexual intercourse, tampons or douching.  · Bleeding may continue for 2-4 weeks.  Amount may vary.    · Call your physician for heavy bleeding which means soaking more than 1 pad per hour    BIRTH CONTROL  · It is possible to become pregnant at any  "time after delivery and while breastfeeding.  · Plan to discuss a method of birth control with your physician at your follow up visit. visit.    DIET AND ELIMINATION  · Eating more fiber (bran cereal, fruits, and vegetables) and drinking plenty of fluids will help to avoid constipation.  · Urinary frequency after childbirth is normal.    POSTPARTUM BLUES  During the first few days after birth, you may experience a sense of the \"blues\" which may include impatience, irritability or even crying.  These feeling come and go quickly.  However, as many as 1 in 10 women experience emotional symptoms known as postpartum depression.    Postpartum depression:  May start as early as the second or third day after delivery or take several weeks or months to develop.  Symptoms of \"blues\" are present, but are more intense:  Crying spells; loss of appetite; feelings of hopelessness or loss of control; fear of touching the baby; over concern or no concern at all about the baby; little or no concern about your own appearance/caring for yourself; and/or inability to sleep or excessive sleeping.  Contact your physician if you are experiencing any of these symptoms.    Crisis Hotline:  · [place] Crisis Hotline:  6-285-KQQNOAY  Or 1-562.927.1345  · [place] Crisis Hotline:  1-175.752.6179  Or 625-041-2155    PREVENTING SHAKEN BABY:  If you are angry or stressed, PUT THE BABY IN THE CRIB, step away, take some deep breaths, and wait until you are calm to care for the baby.  DO NOT SHAKE THE BABY.  You are not alone, call a supporter for help.    · Crisis [facility] Center 24/7 crisis line 084-005-1170 or 1-634.496.2240  · You can also text them, text \"ANSWER\" to 356918    QUIT SMOKING/TOBACCO USE:  I understand the use of any tobacco products increases my chance of suffering from future heart disease and could cause other illnesses which may shorten my life. Quitting the use of tobacco products is the single most important thing I can do to " improve my health. For further information on smoking / tobacco cessation call a Toll Free Quit Line at 1-560.789.7862 (*National [employer] [place]) or 1-802.445.2013 (American Lung Association) or you can access the web based program at www.lungusa.org.    · [place] Tobacco Users Help Line:  (400) 181-4895       Toll Free: 1-337.646.5667  · Quit Tobacco Program [facility] (328)322-3693    DEPRESSION / SUICIDE RISK:  As you are discharged from this [facility], it is important to learn how to keep safe from harming yourself.    Recognize the warning signs:  · Abrupt changes in personality, positive or negative- including increase in energy   · Giving away possessions  · Change in eating patterns- significant weight changes-  positive or negative  · Change in sleeping patterns- unable to sleep or sleeping all the time   · Unwillingness or inability to communicate  · Depression  · Unusual sadness, discouragement and loneliness  · Talk of wanting to die  · Neglect of personal appearance   · Rebelliousness- reckless behavior  · Withdrawal from people/activities they love  · Confusion- inability to concentrate     If you or a loved one observes any of these behaviors or has concerns about self-harm, here's what you can do:  · Talk about it- your feelings and reasons for harming yourself  · Remove any means that you might use to hurt yourself (examples: pills, rope, extension cords, firearm)  · Get professional help from the community (Mental Health, Substance Abuse, psychological counseling)  · Do not be alone:Call your Safe Contact- someone whom you trust who will be there for you.  · Call your local CRISIS HOTLINE 003-2639 or 751-921-3567  · Call your local Children's Mobile Crisis Response Team [place] (797) 489-7385 or www.MyPermissions  · Call the toll free National Suicide Prevention Hotlines   · National Suicide Prevention Lifeline 743-773-KQIP (7048)  · National Hope Line  [facility] 800-SUICIDE (069-1806)    DISCHARGE SURVEY:  Thank you for choosing [facility].  We hope we provided you with very good care.  You may be receiving a survey in the mail.  Please fill it out.  Your opinion is valuable to us.    ADDITIONAL EDUCATIONAL MATERIALS GIVEN TO PATIENT:        My signature on this form indicates that:  1.  I have reviewed and understand the above information  2.  My questions regarding this information have been answered to my satisfaction.  3.  I have formulated a plan with my discharge nurse to obtain my prescribed medication for home.

## 2018-12-20 NOTE — LACTATION NOTE
Met with MOB for a lactation follow up visit.  MOB is discharging home today.  MOB reported pain at nipples of both breasts with breastfeeding.  Breast assessment performed.  The left and right breast are soft and both areolas are pliable.  The right breast has an abrasion at the nipple and is bruised.  The nipple of the left breast has an abrasion and is scabbed. Both nipples are tender to the touch, but no redness observed at either breast.  Attempted to hand express colostrum from the left breast, but MOB reported tenderness at the nipple with pressure, so hand expression was halted immediately.  Assistance offered with latching infant deeper onto the breast, but MOB declined.  MOB stated infant just finished feeding 15 ml of DBM and spit most of it up.  MOB stopped feeding infant after the episode of spitting up.  Infant resting on bed with eyes closed.  No signs of distress observed in infant.  Informed MOB that she can resume feeding infant and that DBM is good for 1 hour after infant has begun suckling at the bottle.  MOB appeared reluctant to feed infant the remainder of the bottle after infant spit up all of the first 15 ml.    Reviewed breastfeeding plan with MOB that is to continue post discharge which is as follows:  1.  Put infant to the breast first at every feeding.  2.  Supplement infant with DBM and/or formula (once at home) according to the [employer] supplementation guidelines.  3.  Pump to protect milk supply.    Discussed with MOB the importance of pumping to establish and maintain milk supply in the presence of a poor latch.    MOB again reminded that she may rent a hospital grade breast pump through the [facility] [facility] prior to discharge and informed MOB on the business hours for the [employer] [facility].    Nipple care discussed and MOB encouraged to apply EBM to sore and damaged nipples and areolas and allow to air dry.  Encouraged MOB to follow up EBM with an application of lanolin  cream.  Provided MOB with lanolin cream along with instructions on use.    MOB verbalized understanding of all information provided to her and denied having any further questions at this time.  Encouraged MOB to call Primary RN or Lactation for latch assistance with the next feed.

## 2018-12-20 NOTE — LACTATION NOTE
MEDARDO informed Primary RN, Wendy Johnson, that she did not have a pump for home use.  MOB provided with a manual breast pump along with instructions on use.  MEDARDO also provided with information on hospital grade breast pump rentals available to her through the [employer] [facility].

## 2018-12-20 NOTE — CARE PLAN
Problem: Altered physiologic condition related to postoperative  delivery  Goal: Patient physiologically stable as evidenced by normal lochia, palpable uterine involution and vital signs within normal limits  Outcome: PROGRESSING AS EXPECTED  Fundus firm at umbilicus with scant lochia.    Problem: Potential for postpartum infection related to surgical incision, compromised uterine condition, urinary tract or respiratory compromise  Goal: Patient will be afebrile and free from signs and symptoms of infection  Outcome: PROGRESSING AS EXPECTED  Afebrile with incision clean,dry, approximated and open to air.    Problem: Alteration in comfort related to surgical incision and/or after birth pains  Goal: Patient is able to ambulate, care for self and infant with acceptable pain level  Outcome: PROGRESSING AS EXPECTED  Ambulating  and voiding without difficulty.  Goal: Patient verbalizes acceptable pain level  Outcome: PROGRESSING AS EXPECTED  Verbalizes acceptable pain relief with pain medication being given as requested.    Problem: Potential knowledge deficit related to lack of understanding of self and  care  Goal: Patient will demonstrate ability to care for self and infant  Outcome: PROGRESSING AS EXPECTED  Patient is breast feeding independently.

## 2018-12-21 NOTE — PROGRESS NOTES
Pt discharged home with  and FOB. Pt doing well, medicated for pain prior to discharge.  Discussed follow up care, pt is aware of follow up appointments for herself and . Car seat check completed. Escorted to car by RN.

## 2019-01-02 ENCOUNTER — POST PARTUM (OUTPATIENT)
Dept: OBGYN | Facility: CLINIC | Age: 34
End: 2019-01-02

## 2019-01-02 VITALS — SYSTOLIC BLOOD PRESSURE: 118 MMHG | BODY MASS INDEX: 28.71 KG/M2 | WEIGHT: 147 LBS | DIASTOLIC BLOOD PRESSURE: 70 MMHG

## 2019-01-02 PROBLEM — O28.0 ABNORMAL ANTENATAL AFP SCREEN: Status: RESOLVED | Noted: 2018-07-09 | Resolved: 2019-01-02

## 2019-01-02 PROBLEM — Z34.83 ENCOUNTER FOR SUPERVISION OF OTHER NORMAL PREGNANCY, THIRD TRIMESTER: Status: RESOLVED | Noted: 2018-06-08 | Resolved: 2019-01-02

## 2019-01-02 PROCEDURE — 99024 POSTOP FOLLOW-UP VISIT: CPT | Performed by: OBSTETRICS & GYNECOLOGY

## 2019-01-02 NOTE — PROGRESS NOTES
Molly Cota is a 33 y.o.  female who presents for her Postpartum Exam      HPI Comments: Pt presents for postpartum incision check exam s/p  for fetal distress on 18. Patient is without complaints.  Baby doing well.  Breast & bottle feeding.  No depression/anxiety.  Not sexually active.  Lochia small.   No urinary complaints. No constipation.  Taking Ibuprofen prn pain.  UNDECIDED for contraception.    Review of Systems:   Pertinent positives documented in HPI and all other systems reviewed & are negative    Last pap: 18 - normal    Physical exam:   Vital measurements:  Blood pressure 118/70, weight 66.7 kg (147 lb), last menstrual period 2018, currently breastfeeding.  Body mass index is 28.71 kg/m². (Goal BMI>18 <25)  Nursing note and vitals reviewed.  Gen: She is oriented to person, place, and time. She appears well-developed and well-nourished. No distress.   Breasts: nontender, not engorged  Abdomen: soft, nontender, nondistended, no rebound/guarding  Extremities: nontender, no clubbing, cyanosis or edema  Pelvic: deferred  Incision: healing well, clean/dry/intact    A/P: Postpartum 2 weeks status post  for fetal distress  Doing well without complaints  Continue prenatal vitamins, cont ibuprofen prn pain  May resume normal activities including exercise, tampons, and intercourse  Contraception - undecided, counseled pt on options for BC while breast feeding (mini pill, depo, IUD, nexplanon), she will think about her options and discuss at PP check  Follow up  for PP check or sooner as needed

## 2019-01-02 NOTE — PROGRESS NOTES
C section check.  Primary C Section done 12/17/18 due to fetal intolerance of labor  Patient is breast feeding  Vaginal bleeding is light.  No problems today

## 2019-01-31 ENCOUNTER — POST PARTUM (OUTPATIENT)
Dept: OBGYN | Facility: CLINIC | Age: 34
End: 2019-01-31

## 2019-01-31 VITALS — DIASTOLIC BLOOD PRESSURE: 70 MMHG | SYSTOLIC BLOOD PRESSURE: 108 MMHG | BODY MASS INDEX: 28.9 KG/M2 | WEIGHT: 148 LBS

## 2019-01-31 PROBLEM — D25.9 UTERINE FIBROID IN PREGNANCY: Status: RESOLVED | Noted: 2018-06-11 | Resolved: 2019-01-31

## 2019-01-31 PROBLEM — O34.10 UTERINE FIBROID IN PREGNANCY: Status: RESOLVED | Noted: 2018-06-11 | Resolved: 2019-01-31

## 2019-01-31 PROCEDURE — 90050 PR POSTPARTUM VISIT: CPT | Performed by: PHYSICIAN ASSISTANT

## 2019-01-31 ASSESSMENT — ENCOUNTER SYMPTOMS
DIZZINESS: 0
CONSTITUTIONAL NEGATIVE: 1
MUSCULOSKELETAL NEGATIVE: 1
CONSTIPATION: 0
CARDIOVASCULAR NEGATIVE: 1
NEUROLOGICAL NEGATIVE: 1
VOMITING: 0
DIARRHEA: 0
EYES NEGATIVE: 1
DEPRESSION: 0
RESPIRATORY NEGATIVE: 1
NAUSEA: 0
HEADACHES: 0
PSYCHIATRIC NEGATIVE: 1
ABDOMINAL PAIN: 1

## 2019-01-31 ASSESSMENT — LIFESTYLE VARIABLES: SUBSTANCE_ABUSE: 0

## 2019-01-31 NOTE — PROGRESS NOTES
"Subjective:      Molly Cota is a 33 y.o. female who presents with Postpartum visit today. 6wk s/p primary C/S at term without complications, for failed IOL, NRFHT. Pt has no complaints- denies heavy vaginal bleeding, depression, intercourse or problems with BF but pt has had occ pain in RUQ, no N/V/D associated, not musculoskeletal per pt, and feels better with rest, feels like \"air moving through.\" PAP wnl 6/18 - repeat 6/19. BCM desired is condoms, as pt wants another baby within the next year. Recommended pt wait at least 18mo-2yrs.            HPI    Review of Systems   Constitutional: Negative.    HENT: Negative.    Eyes: Negative.    Respiratory: Negative.    Cardiovascular: Negative.    Gastrointestinal: Positive for abdominal pain (RUQ, no N/V/D). Negative for constipation, diarrhea, nausea and vomiting.   Genitourinary: Negative.    Musculoskeletal: Negative.    Skin: Negative.    Neurological: Negative.  Negative for dizziness and headaches.   Endo/Heme/Allergies: Negative.    Psychiatric/Behavioral: Negative.  Negative for depression, substance abuse and suicidal ideas.   All other systems reviewed and are negative.         Objective:     /70   Wt 67.1 kg (148 lb)   LMP 03/05/2018   BMI 28.90 kg/m²      Physical Exam   Constitutional: She appears well-developed and well-nourished.   HENT:   Head: Normocephalic and atraumatic.   Eyes: Pupils are equal, round, and reactive to light.   Neck: Normal range of motion. Neck supple. No thyromegaly present.   Cardiovascular: Normal rate, regular rhythm and normal heart sounds.    Pulmonary/Chest: Effort normal and breath sounds normal. No respiratory distress.   Abdominal: Soft. Bowel sounds are normal. She exhibits no distension. There is no tenderness. There is no rigidity, no rebound, no guarding, no CVA tenderness, no tenderness at McBurney's point and negative Segura's sign.   C/S scar - C/D/I without erythema or " induration  *Adhesive noted to be on top of scar, appears to be keloid, black color - easily removed with rubbing, scar beneath is healing well, no issues, no keloid   Genitourinary: Vagina normal and uterus normal. Pelvic exam was performed with patient supine. There is no rash or tenderness on the right labia. There is no rash or tenderness on the left labia. Uterus is not deviated, not enlarged and not tender. Cervix exhibits no motion tenderness. Right adnexum displays no mass and no tenderness. Left adnexum displays no mass and no tenderness. No erythema in the vagina. No foreign body in the vagina. No signs of injury around the vagina. No vaginal discharge found.   Neurological: She is alert. She has normal reflexes.   Skin: Skin is warm and dry. No erythema.   Psychiatric: She has a normal mood and affect. Her behavior is normal. Thought content normal.   Vitals reviewed.              Assessment/Plan:     1. Postpartum care following vaginal delivery  - Pap wnl - repeat 6/19  - Condoms for BCM    2. Gas pain  - Pt to try warm packs, simethicone prn

## 2019-01-31 NOTE — PROGRESS NOTES
Pt here today for postpartum exam.  Delivery type:c section on 12/17/18  Currently : brest feeding  Desired BCM:  condoms   LMP: n/a  Last pap:6/18/18 result Negative  Phone #[phone]  C/o feeling dizzy and and off, pain on upper right side of abdomen. Pain can be very strong lasting about 5 minutes.

## 2021-06-28 ENCOUNTER — TELEPHONE (OUTPATIENT)
Dept: OBGYN | Facility: CLINIC | Age: 36
End: 2021-06-28

## 2021-06-28 NOTE — TELEPHONE ENCOUNTER
Pt called asking to have an appt sooner due to some cramping/pain in abdominal. I let patient know we are booked until August. Patient was given precautions and when to go to the ER patient is aware she can take tylenol. Patient understood and had no further questions.

## 2021-07-21 ENCOUNTER — GYNECOLOGY VISIT (OUTPATIENT)
Dept: OBGYN | Facility: CLINIC | Age: 36
End: 2021-07-21

## 2021-07-21 VITALS
SYSTOLIC BLOOD PRESSURE: 116 MMHG | WEIGHT: 146 LBS | DIASTOLIC BLOOD PRESSURE: 62 MMHG | BODY MASS INDEX: 28.66 KG/M2 | HEIGHT: 60 IN

## 2021-07-21 DIAGNOSIS — Z32.01 POSITIVE PREGNANCY TEST: ICD-10-CM

## 2021-07-21 DIAGNOSIS — N93.8 DUB (DYSFUNCTIONAL UTERINE BLEEDING): ICD-10-CM

## 2021-07-21 LAB — IN CLINIC OB SCAN: NORMAL

## 2021-07-21 PROCEDURE — 76830 TRANSVAGINAL US NON-OB: CPT | Performed by: OBSTETRICS & GYNECOLOGY

## 2021-07-21 PROCEDURE — 99213 OFFICE O/P EST LOW 20 MIN: CPT | Mod: 25 | Performed by: OBSTETRICS & GYNECOLOGY

## 2021-07-21 NOTE — PROGRESS NOTES
CC: Confirmation of Pregnancy    HPI: Pt is a 34 yo  lmp 21 who presents for evaluation of amenorrhea.  She has had no bleeding since her last menses.  A urine pregnancy test was positive.  She complains of some intermittent low back pain, which resolves spontaneously.  She denies nausea and vomiting.      History reviewed. No pertinent past medical history.    Past Surgical History:   Procedure Laterality Date   • PRIMARY C SECTION N/A 2018    Procedure: PRIMARY C SECTION;  Surgeon: Bisi Dorantes M.D.;  Location: LABOR AND DELIVERY;  Service: Obstetrics         Current Outpatient Medications:   •  docusate sodium (COLACE) 100 MG Cap, Take 1 Cap by mouth 2 times a day. (Patient not taking: Reported on 2019), Disp: 60 Cap, Rfl: 5  •  Prenatal Multivit-Min-Fe-FA (PRENATAL VITAMINS PO), Take  by mouth., Disp: , Rfl:     Patient has no known allergies.    Family History   Problem Relation Age of Onset   • No Known Problems Mother    • Cancer Sister    • No Known Problems Brother    • Cancer Maternal Grandmother        Social History     Socioeconomic History   • Marital status:      Spouse name: Not on file   • Number of children: Not on file   • Years of education: Not on file   • Highest education level: Not on file   Occupational History   • Not on file   Tobacco Use   • Smoking status: Former Smoker     Packs/day: 0.10     Types: Cigarettes     Quit date: 2018     Years since quitting: 3.2   • Smokeless tobacco: Never Used   Substance and Sexual Activity   • Alcohol use: Not Currently   • Drug use: No   • Sexual activity: Yes     Partners: Male   Other Topics Concern   • Not on file   Social History Narrative   • Not on file     Social Determinants of Health     Financial Resource Strain:    • Difficulty of Paying Living Expenses:    Food Insecurity:    • Worried About Running Out of Food in the Last Year:    • Ran Out of Food in the Last Year:    Transportation Needs:    •  Lack of Transportation (Medical):    • Lack of Transportation (Non-Medical):    Physical Activity:    • Days of Exercise per Week:    • Minutes of Exercise per Session:    Stress:    • Feeling of Stress :    Social Connections:    • Frequency of Communication with Friends and Family:    • Frequency of Social Gatherings with Friends and Family:    • Attends Hindu Services:    • Active Member of Clubs or Organizations:    • Attends Club or Organization Meetings:    • Marital Status:    Intimate Partner Violence:    • Fear of Current or Ex-Partner:    • Emotionally Abused:    • Physically Abused:    • Sexually Abused:        OB History    Para Term  AB Living   2 2 2 0 0 2   SAB TAB Ectopic Molar Multiple Live Births   0 0 0 0 0 2       ROS: negative for dizziness, SOB, chest pain, palpitations, dysuria, vaginal discharge.    /62   Ht 1.524 m (5')   Wt 66.2 kg (146 lb)     GENERAL: Alert, in no apparent distress  PSYCHIATRIC: Appropriate affect, intact insight and judgement.  ABDOMEN: Soft, nontender, nondistended.  No palpable masses. No hepatosplenomegaly.   No rebound or guarding.  No inguinal lymphadenopathy.  BACK: No CVA tenderness  EXTREMITIES: No edema, no calf tenderness.    GENITOURINARY:  Normal external genitalia, no lesions.  Normal urethral meatus, no masses or tenderness.  Normal bladder without fullness or masses.  Vagina well estrogenized, no vaginal discharge or lesions.  Cervix normal length, nontender.  Uterus 10 weeks,  nontender.  Adnexa nontender, no masses.  Normal anus and perineum.      TRANSVAGINAL ULTRASOUND - performed and interpreted by me    Single gestational sac present.  Yolk sac not visualized.     Buck intrauterine pregnancy with CRL measuring 4.77 cm, c/w 11+4/7 weeks EGA, positive fetal cardiac activity noted.  FHTs 188.  EDC 22.     No fluid in cul de sac.    Ovaries and cervix appear grossly normal. Cervical length = 4.17  perez.      ASSESSMENT/PLAN:  DUB visit - Buck IUP at 11+4/7 wks.  ARIANNA will be 2/5/22 by US today. Prenatal Vitamins.  F/U for NOB appointment 3 weeks.

## 2021-07-21 NOTE — NON-PROVIDER
Patient here for GYN/DUB.  UPT  LMP 5/11/21  ARIANNA  GA  Last pap 2019 Negative    [phone] (home)   Pharmacy verified  c/o

## 2021-07-23 ENCOUNTER — APPOINTMENT (OUTPATIENT)
Dept: OBGYN | Facility: CLINIC | Age: 36
End: 2021-07-23

## 2021-08-13 ENCOUNTER — INITIAL PRENATAL (OUTPATIENT)
Dept: OBGYN | Facility: CLINIC | Age: 36
End: 2021-08-13

## 2021-08-13 ENCOUNTER — HOSPITAL ENCOUNTER (OUTPATIENT)
Facility: MEDICAL CENTER | Age: 36
End: 2021-08-13
Attending: PHYSICIAN ASSISTANT
Payer: COMMERCIAL

## 2021-08-13 VITALS — BODY MASS INDEX: 28.9 KG/M2 | WEIGHT: 148 LBS | DIASTOLIC BLOOD PRESSURE: 64 MMHG | SYSTOLIC BLOOD PRESSURE: 98 MMHG

## 2021-08-13 DIAGNOSIS — O09.522 ADVANCED MATERNAL AGE IN MULTIGRAVIDA, SECOND TRIMESTER: ICD-10-CM

## 2021-08-13 DIAGNOSIS — Z98.891 HISTORY OF C-SECTION: ICD-10-CM

## 2021-08-13 DIAGNOSIS — Z34.01 ENCOUNTER FOR SUPERVISION OF NORMAL FIRST PREGNANCY IN FIRST TRIMESTER: ICD-10-CM

## 2021-08-13 LAB
APPEARANCE UR: ABNORMAL
BILIRUB UR STRIP-MCNC: ABNORMAL MG/DL
COLOR UR AUTO: ABNORMAL
GLUCOSE UR STRIP.AUTO-MCNC: NEGATIVE MG/DL
KETONES UR STRIP.AUTO-MCNC: NEGATIVE MG/DL
LEUKOCYTE ESTERASE UR QL STRIP.AUTO: NEGATIVE
NITRITE UR QL STRIP.AUTO: NEGATIVE
PH UR STRIP.AUTO: 8.5 [PH] (ref 5–8)
PROT UR QL STRIP: ABNORMAL MG/DL
RBC UR QL AUTO: NEGATIVE
SP GR UR STRIP.AUTO: 1.02
UROBILINOGEN UR STRIP-MCNC: ABNORMAL MG/DL

## 2021-08-13 PROCEDURE — 59402 PR NEW OB HIGH RISK: CPT | Performed by: PHYSICIAN ASSISTANT

## 2021-08-13 PROCEDURE — 8199 PREG CTR HIGH RISK PKG RATE (SYSTEM): Performed by: PHYSICIAN ASSISTANT

## 2021-08-13 PROCEDURE — 81002 URINALYSIS NONAUTO W/O SCOPE: CPT | Performed by: PHYSICIAN ASSISTANT

## 2021-08-13 ASSESSMENT — EDINBURGH POSTNATAL DEPRESSION SCALE (EPDS)
I HAVE BEEN ABLE TO LAUGH AND SEE THE FUNNY SIDE OF THINGS: AS MUCH AS I ALWAYS COULD
I HAVE LOOKED FORWARD WITH ENJOYMENT TO THINGS: AS MUCH AS I EVER DID
I HAVE BEEN ANXIOUS OR WORRIED FOR NO GOOD REASON: YES, SOMETIMES
I HAVE FELT SAD OR MISERABLE: NO, NOT AT ALL
I HAVE BEEN SO UNHAPPY THAT I HAVE BEEN CRYING: ONLY OCCASIONALLY
I HAVE BLAMED MYSELF UNNECESSARILY WHEN THINGS WENT WRONG: YES, SOME OF THE TIME
TOTAL SCORE: 8
I HAVE BEEN SO UNHAPPY THAT I HAVE HAD DIFFICULTY SLEEPING: NOT VERY OFTEN
THE THOUGHT OF HARMING MYSELF HAS OCCURRED TO ME: NEVER
THINGS HAVE BEEN GETTING ON TOP OF ME: NO, MOST OF THE TIME I HAVE COPED QUITE WELL
I HAVE FELT SCARED OR PANICKY FOR NO GOOD REASON: NO, NOT MUCH

## 2021-08-13 ASSESSMENT — ENCOUNTER SYMPTOMS
NEUROLOGICAL NEGATIVE: 1
GASTROINTESTINAL NEGATIVE: 1
CONSTITUTIONAL NEGATIVE: 1
RESPIRATORY NEGATIVE: 1
PSYCHIATRIC NEGATIVE: 1
MUSCULOSKELETAL NEGATIVE: 1
CARDIOVASCULAR NEGATIVE: 1
EYES NEGATIVE: 1

## 2021-08-13 NOTE — NON-PROVIDER
Pt. Here for NOB visit.  # [phone]  Pt had DUB on 7/21/2021  Last pap smear 2018 WNL  Pt. States having nausea and some cramping that come and go.   Pharmacy verified.   Chaperone offered and not needed.   AFP offered and declined.

## 2021-08-13 NOTE — PROGRESS NOTES
Subjective     Molly Cota is a 35 y.o. female who presents with New ob visit. Pt sure of LMP and ARIANNA confirmed by 11 wk US. First pregnancy was  without complications in [facility] then needed primary C/S with last pregnancy for NRFHT at 41wk, pt denies GDM, PIH or delivery complicaitons though pt states she had abnormal AFP, which was very stressful for her, so she declines all testing with this pregnancy. Pt also wants TOLAC, risks reviewed with pt and pt wants BTL if C/S is needed. Denies PMHx, SHx. Denies tob, eoth or drug. NDKA. Taking PNV only. Pt currently denies cramping, bleeding or pain. +FM.              HPI    Review of Systems   Constitutional: Negative.    HENT: Negative.    Eyes: Negative.    Respiratory: Negative.    Cardiovascular: Negative.    Gastrointestinal: Negative.    Genitourinary: Negative.    Musculoskeletal: Negative.    Skin: Negative.    Neurological: Negative.    Endo/Heme/Allergies: Negative.    Psychiatric/Behavioral: Negative.    All other systems reviewed and are negative.             Objective     BP (!) 98/64   Wt 67.1 kg (148 lb)   LMP 2021   BMI 28.90 kg/m²      Physical Exam  Vitals reviewed.   Constitutional:       Appearance: She is well-developed.   HENT:      Head: Normocephalic and atraumatic.   Eyes:      Pupils: Pupils are equal, round, and reactive to light.   Neck:      Thyroid: No thyromegaly.   Cardiovascular:      Rate and Rhythm: Normal rate and regular rhythm.      Heart sounds: Normal heart sounds.   Pulmonary:      Effort: Pulmonary effort is normal. No respiratory distress.      Breath sounds: Normal breath sounds.   Abdominal:      General: Bowel sounds are normal. There is no distension.      Palpations: Abdomen is soft.      Tenderness: There is no abdominal tenderness.   Genitourinary:     Exam position: Supine.      Labia:         Right: No rash or tenderness.         Left: No rash or tenderness.       Vagina: Normal.  No signs of injury and foreign body. No vaginal discharge or erythema.      Cervix: No cervical motion tenderness.      Uterus: Enlarged (Gravid, uterus c/w 15 wk size). Not deviated and not tender.       Adnexa:         Right: No mass or tenderness.          Left: No mass or tenderness.     Musculoskeletal:      Cervical back: Normal range of motion and neck supple.   Skin:     General: Skin is warm and dry.      Findings: No erythema.   Neurological:      Mental Status: She is alert.      Deep Tendon Reflexes: Reflexes are normal and symmetric.   Psychiatric:         Behavior: Behavior normal.         Thought Content: Thought content normal.                             Assessment & Plan        1. Encounter for supervision of normal first pregnancy in first trimester  - F/u 4wk, US 5 wk  - POCT Urinalysis  - PREG CNTR PRENATAL PN; Future  - US-OB 2ND 3RD TRI COMPLETE; Future  - THINPREP RFLX HPV ASCUS W/CTNG; Future  - URINE DRUG SCREEN W/CONF (AR); Future  - HEP C VIRUS ANTIBODY; Future  - Consent for Refusal of Drugs, RX, Procedures (Women's Health) - Declines AFP    2. History of C/S x 1 for NRFHT - desires TOLAC, though BTL if C/S needed  - Consent form given today    3. Advanced maternal age in multigravida, second trimester  - Declines AFP, NIPT testing

## 2021-08-16 DIAGNOSIS — Z34.01 ENCOUNTER FOR SUPERVISION OF NORMAL FIRST PREGNANCY IN FIRST TRIMESTER: ICD-10-CM

## 2021-08-19 LAB
HPV HR 12 DNA CVX QL NAA+PROBE: NEGATIVE
HPV16 DNA SPEC QL NAA+PROBE: NEGATIVE
HPV18 DNA SPEC QL NAA+PROBE: NEGATIVE
SPECIMEN SOURCE: NORMAL

## 2021-08-26 ENCOUNTER — HOSPITAL ENCOUNTER (OUTPATIENT)
Dept: LAB | Facility: MEDICAL CENTER | Age: 36
End: 2021-08-26
Attending: PHYSICIAN ASSISTANT
Payer: COMMERCIAL

## 2021-08-26 DIAGNOSIS — Z34.01 ENCOUNTER FOR SUPERVISION OF NORMAL FIRST PREGNANCY IN FIRST TRIMESTER: ICD-10-CM

## 2021-08-26 LAB
ABO GROUP BLD: NORMAL
APPEARANCE UR: CLEAR
BASOPHILS # BLD AUTO: 0.3 % (ref 0–1.8)
BASOPHILS # BLD: 0.03 K/UL (ref 0–0.12)
BILIRUB UR QL STRIP.AUTO: NEGATIVE
BLD GP AB SCN SERPL QL: NORMAL
COLOR UR: YELLOW
EOSINOPHIL # BLD AUTO: 0.09 K/UL (ref 0–0.51)
EOSINOPHIL NFR BLD: 0.9 % (ref 0–6.9)
ERYTHROCYTE [DISTWIDTH] IN BLOOD BY AUTOMATED COUNT: 43.8 FL (ref 35.9–50)
GLUCOSE UR STRIP.AUTO-MCNC: NEGATIVE MG/DL
HBV SURFACE AG SER QL: ABNORMAL
HCT VFR BLD AUTO: 36.7 % (ref 37–47)
HCV AB SER QL: NORMAL
HGB BLD-MCNC: 12.2 G/DL (ref 12–16)
HIV 1+2 AB+HIV1 P24 AG SERPL QL IA: NORMAL
IMM GRANULOCYTES # BLD AUTO: 0.03 K/UL (ref 0–0.11)
IMM GRANULOCYTES NFR BLD AUTO: 0.3 % (ref 0–0.9)
KETONES UR STRIP.AUTO-MCNC: NEGATIVE MG/DL
LEUKOCYTE ESTERASE UR QL STRIP.AUTO: NEGATIVE
LYMPHOCYTES # BLD AUTO: 1.7 K/UL (ref 1–4.8)
LYMPHOCYTES NFR BLD: 16.6 % (ref 22–41)
MCH RBC QN AUTO: 30.6 PG (ref 27–33)
MCHC RBC AUTO-ENTMCNC: 33.2 G/DL (ref 33.6–35)
MCV RBC AUTO: 92 FL (ref 81.4–97.8)
MICRO URNS: ABNORMAL
MONOCYTES # BLD AUTO: 0.4 K/UL (ref 0–0.85)
MONOCYTES NFR BLD AUTO: 3.9 % (ref 0–13.4)
NEUTROPHILS # BLD AUTO: 8 K/UL (ref 2–7.15)
NEUTROPHILS NFR BLD: 78 % (ref 44–72)
NITRITE UR QL STRIP.AUTO: NEGATIVE
NRBC # BLD AUTO: 0 K/UL
NRBC BLD-RTO: 0 /100 WBC
PH UR STRIP.AUTO: 7 [PH] (ref 5–8)
PLATELET # BLD AUTO: 274 K/UL (ref 164–446)
PMV BLD AUTO: 11.1 FL (ref 9–12.9)
PROT UR QL STRIP: NEGATIVE MG/DL
RBC # BLD AUTO: 3.99 M/UL (ref 4.2–5.4)
RBC UR QL AUTO: NEGATIVE
RH BLD: NORMAL
RUBV AB SER QL: >500 IU/ML
SP GR UR STRIP.AUTO: 1.01
TREPONEMA PALLIDUM IGG+IGM AB [PRESENCE] IN SERUM OR PLASMA BY IMMUNOASSAY: ABNORMAL
UROBILINOGEN UR STRIP.AUTO-MCNC: 0.2 MG/DL
WBC # BLD AUTO: 10.3 K/UL (ref 4.8–10.8)

## 2021-08-28 LAB
AMPHET CTO UR CFM-MCNC: NEGATIVE NG/ML
BARBITURATES CTO UR CFM-MCNC: NEGATIVE NG/ML
BENZODIAZ CTO UR CFM-MCNC: NEGATIVE NG/ML
CANNABINOIDS CTO UR CFM-MCNC: NEGATIVE NG/ML
COCAINE CTO UR CFM-MCNC: NEGATIVE NG/ML
DRUG COMMENT 753798: NORMAL
METHADONE CTO UR CFM-MCNC: NEGATIVE NG/ML
OPIATES CTO UR CFM-MCNC: NEGATIVE NG/ML
PCP CTO UR CFM-MCNC: NEGATIVE NG/ML
PROPOXYPH CTO UR CFM-MCNC: NEGATIVE NG/ML

## 2021-08-31 PROBLEM — R87.610 ASCUS OF CERVIX WITH NEGATIVE HIGH RISK HPV: Status: ACTIVE | Noted: 2021-08-31

## 2021-09-13 ENCOUNTER — ROUTINE PRENATAL (OUTPATIENT)
Dept: OBGYN | Facility: CLINIC | Age: 36
End: 2021-09-13

## 2021-09-13 VITALS — BODY MASS INDEX: 28.47 KG/M2 | WEIGHT: 145.8 LBS | SYSTOLIC BLOOD PRESSURE: 110 MMHG | DIASTOLIC BLOOD PRESSURE: 60 MMHG

## 2021-09-13 DIAGNOSIS — O09.522 ADVANCED MATERNAL AGE IN MULTIGRAVIDA, SECOND TRIMESTER: Primary | ICD-10-CM

## 2021-09-13 PROCEDURE — 90040 PR PRENATAL FOLLOW UP: CPT | Performed by: NURSE PRACTITIONER

## 2021-09-13 NOTE — PROGRESS NOTES
OB follow up   + fetal movement. Active  No VB, LOF or UC's.  Phone # [phone]  Preferred pharmacy confirmed.  No complaints as of today

## 2021-09-17 ENCOUNTER — APPOINTMENT (OUTPATIENT)
Dept: RADIOLOGY | Facility: IMAGING CENTER | Age: 36
End: 2021-09-17
Attending: PHYSICIAN ASSISTANT

## 2021-09-17 DIAGNOSIS — Z34.01 ENCOUNTER FOR SUPERVISION OF NORMAL FIRST PREGNANCY IN FIRST TRIMESTER: ICD-10-CM

## 2021-09-17 PROCEDURE — 76805 OB US >/= 14 WKS SNGL FETUS: CPT | Mod: TC | Performed by: PHYSICIAN ASSISTANT

## 2021-09-20 DIAGNOSIS — O28.3 ABNORMAL PREGNANCY US: ICD-10-CM

## 2021-09-21 ENCOUNTER — HOSPITAL ENCOUNTER (OUTPATIENT)
Dept: LAB | Facility: MEDICAL CENTER | Age: 36
End: 2021-09-21
Attending: PHYSICIAN ASSISTANT
Payer: COMMERCIAL

## 2021-09-21 ENCOUNTER — TELEPHONE (OUTPATIENT)
Dept: OBGYN | Facility: CLINIC | Age: 36
End: 2021-09-21

## 2021-09-21 DIAGNOSIS — O28.3 ABNORMAL PREGNANCY US: ICD-10-CM

## 2021-09-21 NOTE — TELEPHONE ENCOUNTER
----- Message from ROSARIO Parisi sent at 9/20/2021  4:03 PM PDT -----  Please have pt do AFP testing asap, as no nasal bones seen on US, which could be linked to genetic anomalies. Thank you.       Called pt and notified of US results of no nasal bone seen. Explained to pt d/t these results provider is recommending to do AFP testing to check for DS, Trisomy 18 and NTD. Pt agreed to do testing. Advised pt to do test ASAP since it will only be able to do it before 24 wks. Explained to pt she can do test at any time during the day and the order is placed and she can go to any [facility]. Pt agreed and verbalized understanding.

## 2021-09-24 ENCOUNTER — TELEPHONE (OUTPATIENT)
Dept: OBGYN | Facility: CLINIC | Age: 36
End: 2021-09-24

## 2021-09-24 LAB
# FETUSES US: NORMAL
AFP MOM SERPL: 0.92
AFP SERPL-MCNC: 60 NG/ML
AGE - REPORTED: 36.2 YR
CURRENT SMOKER: NO
FAMILY MEMBER DISEASES HX: NO
GA METHOD: NORMAL
GA: NORMAL WK
HCG MOM SERPL: 0.4
HCG SERPL-ACNC: 8097 IU/L
HX OF HEREDITARY DISORDERS: NO
IDDM PATIENT QL: NO
INHIBIN A MOM SERPL: 1.01
INHIBIN A SERPL-MCNC: 171 PG/ML
INTEGRATED SCN PATIENT-IMP: NORMAL
PATHOLOGY STUDY: NORMAL
SPECIMEN DRAWN SERPL: NORMAL
U ESTRIOL MOM SERPL: 0.65
U ESTRIOL SERPL-MCNC: 1.72 NG/ML

## 2021-09-24 NOTE — TELEPHONE ENCOUNTER
09/24/21 11:28 AM    Pt called wanting results of her AFP, let pt know that as soon as her provider signs off on those results she will be notified. Pt understood and no further questions.

## 2021-09-28 ENCOUNTER — TELEPHONE (OUTPATIENT)
Dept: OBGYN | Facility: CLINIC | Age: 36
End: 2021-09-28

## 2021-09-28 NOTE — TELEPHONE ENCOUNTER
Pt called requesting AFP results. Per Juanis Ryder CNM who reviewed results, are WNL  Pt informed

## 2021-10-13 ENCOUNTER — ROUTINE PRENATAL (OUTPATIENT)
Dept: OBGYN | Facility: CLINIC | Age: 36
End: 2021-10-13

## 2021-10-13 VITALS — DIASTOLIC BLOOD PRESSURE: 62 MMHG | BODY MASS INDEX: 30.19 KG/M2 | SYSTOLIC BLOOD PRESSURE: 110 MMHG | WEIGHT: 154.6 LBS

## 2021-10-13 DIAGNOSIS — O09.522 ADVANCED MATERNAL AGE IN MULTIGRAVIDA, SECOND TRIMESTER: ICD-10-CM

## 2021-10-13 PROCEDURE — 90040 PR PRENATAL FOLLOW UP: CPT | Performed by: PHYSICIAN ASSISTANT

## 2021-10-13 NOTE — NON-PROVIDER
Pt is 35 year old female presenting for an OB follow-up. She is  and is 23w4d gestation. Pt is taking pre- vitamins and reports feeling fetal movement. States she is drinking lots of water. Denied flu vaccine. Pt got ultrasound and AFP. Pt denies bleeding, LOF, vomiting and nausea.     Pt is alert and oriented and not in any acute distress. Fundal height measured at 24cm and fetal heart rate measured at 145bpm on doppler.     Ultrasound and AFP results discussed with patient today. Pt encouraged to continue eating healthy and drinking plenty of water. She was given information regarding the flu vaccine reporting she wanted to think about it. Follow-up in 1 month.

## 2021-10-13 NOTE — PROGRESS NOTES
Pt has no complaints with cramping, bleeding or pain. +FM. US wnl though nasal bone not seen, AFP wnl, so no f/u needed at this time, though pt offered another US. 1hr GTT to be done next visit. Flu vaccine - pt to think about. RTC 4 wk or sooner prn.     Note by AYAAN Weathers:    Pt is 35 year old female presenting for an OB follow-up. She is  and is 23w4d gestation. Pt is taking pre- vitamins and reports feeling fetal movement. States she is drinking lots of water. Denied flu vaccine. Pt got ultrasound and AFP. Pt denies bleeding, LOF, vomiting and nausea.      Pt is alert and oriented and not in any acute distress. Fundal height measured at 24cm and fetal heart rate measured at 145bpm on doppler.      Ultrasound and AFP results discussed with patient today. Pt encouraged to continue eating healthy and drinking plenty of water. She was given information regarding the flu vaccine reporting she wanted to think about it. Follow-up in 1 month.

## 2021-10-13 NOTE — PROGRESS NOTES
Pt. Here for OB/FU. Reports Good FM.   Good # [phone]  Pt states no complaints.   Pharmacy verified.   Chaperone offered and not needed.   Flu offered and would like to read information please ask on next visit.

## 2021-11-16 ENCOUNTER — ROUTINE PRENATAL (OUTPATIENT)
Dept: OBGYN | Facility: CLINIC | Age: 36
End: 2021-11-16

## 2021-11-16 ENCOUNTER — HOSPITAL ENCOUNTER (OUTPATIENT)
Dept: LAB | Facility: MEDICAL CENTER | Age: 36
End: 2021-11-16
Attending: ADVANCED PRACTICE MIDWIFE
Payer: COMMERCIAL

## 2021-11-16 VITALS — WEIGHT: 156 LBS | BODY MASS INDEX: 30.47 KG/M2 | SYSTOLIC BLOOD PRESSURE: 100 MMHG | DIASTOLIC BLOOD PRESSURE: 62 MMHG

## 2021-11-16 DIAGNOSIS — O09.523 MULTIGRAVIDA OF ADVANCED MATERNAL AGE IN THIRD TRIMESTER: ICD-10-CM

## 2021-11-16 LAB
BASOPHILS # BLD AUTO: 0.3 % (ref 0–1.8)
BASOPHILS # BLD: 0.03 K/UL (ref 0–0.12)
EOSINOPHIL # BLD AUTO: 0.1 K/UL (ref 0–0.51)
EOSINOPHIL NFR BLD: 1.1 % (ref 0–6.9)
ERYTHROCYTE [DISTWIDTH] IN BLOOD BY AUTOMATED COUNT: 43 FL (ref 35.9–50)
GLUCOSE 1H P 50 G GLC PO SERPL-MCNC: 103 MG/DL (ref 70–139)
HCT VFR BLD AUTO: 39.4 % (ref 37–47)
HGB BLD-MCNC: 12.9 G/DL (ref 12–16)
IMM GRANULOCYTES # BLD AUTO: 0.04 K/UL (ref 0–0.11)
IMM GRANULOCYTES NFR BLD AUTO: 0.5 % (ref 0–0.9)
LYMPHOCYTES # BLD AUTO: 1.76 K/UL (ref 1–4.8)
LYMPHOCYTES NFR BLD: 19.9 % (ref 22–41)
MCH RBC QN AUTO: 29.7 PG (ref 27–33)
MCHC RBC AUTO-ENTMCNC: 32.7 G/DL (ref 33.6–35)
MCV RBC AUTO: 90.8 FL (ref 81.4–97.8)
MONOCYTES # BLD AUTO: 0.37 K/UL (ref 0–0.85)
MONOCYTES NFR BLD AUTO: 4.2 % (ref 0–13.4)
NEUTROPHILS # BLD AUTO: 6.54 K/UL (ref 2–7.15)
NEUTROPHILS NFR BLD: 74 % (ref 44–72)
NRBC # BLD AUTO: 0 K/UL
NRBC BLD-RTO: 0 /100 WBC
PLATELET # BLD AUTO: 256 K/UL (ref 164–446)
PMV BLD AUTO: 10.4 FL (ref 9–12.9)
RBC # BLD AUTO: 4.34 M/UL (ref 4.2–5.4)
TREPONEMA PALLIDUM IGG+IGM AB [PRESENCE] IN SERUM OR PLASMA BY IMMUNOASSAY: NORMAL
WBC # BLD AUTO: 8.8 K/UL (ref 4.8–10.8)

## 2021-11-16 PROCEDURE — 90686 IIV4 VACC NO PRSV 0.5 ML IM: CPT | Performed by: ADVANCED PRACTICE MIDWIFE

## 2021-11-16 PROCEDURE — 90471 IMMUNIZATION ADMIN: CPT | Performed by: ADVANCED PRACTICE MIDWIFE

## 2021-11-16 PROCEDURE — 90040 PR PRENATAL FOLLOW UP: CPT | Performed by: ADVANCED PRACTICE MIDWIFE

## 2021-11-16 NOTE — LETTER
Cuente los Movimientos de atwood Bebé  Otro paso importante para la marquis de atwood bebé    Molly Jimenez     [facility]            Dept: [phone]    ¿Cuántas semanas tiene de embarazo? 28w3d    Fecha cuando tiene que comenzar a contar el movimiento: 11/16/21                  Maira debe usar tarik diagrama    George manera en que atwood doctor puede controlar a marquis de atwood bebé es sabiendo cuantas veces se mueve atwood bebé en el útero, o por medio de las “pataditas”.  Usted podrá ayudarle a atwood médico al usar cada día el siguiente diagrama.    Cada día, usted debe prestar atención a cuantas horas le lleva a atwood bebé moverse 10 veces.  Comience a contar en la mañana, lo antes posible después de haberse levantado.    · Primeramente, escriba la hora en que se mueve atwood bebé, hasta llegar a 10 veces.  · Colóquele un check o palomita a cada cuadrito cada vez que atwood bebé se mueva hasta que complete 10 veces.  · Escriba la hora cuando termine de contar 10 veces en la última columna.  · Sume el total del tiempo que le llevó contar los 10 movimientos.  · Finalmente, complete el cuadrito de cuantas horas le llevó hacerlo.    Después de eleazar contado los 10 movimientos, ya no tendrá que contar los demás movimientos por el tran del día.  A la mañana siguiente, comience a contar de nuevo cuantas veces se mueve el bebé desde el momento en que se levante.    ¿Qué tendría que considerarse un “movimiento”?  Es difícil de decirlo porque es distinto de george madre a otra, y de un embarazo a otro.  Lo importante es que cuente el movimiento de la misma manera miller el transcurso de atwood embarazo.  Si tiene preguntas adicionales, pregúntele a atwood doctor.    ¡Cuente cuidadosamente cada día!     MUESTRA:  Semana 28    ¿Cuántas horas le ha llevado sentir 10 movimientos?        Hora de Inicio     1     2     3     4     5     6     7     8     9     10   Hora de Finlizar   Dustin. 8:20 ·  ·  ·  ·  ·  ·  ·  ·  ·   ·  11:40   Mar.               Mié.               Danae.               Vie.               Sáb.               Dom.                 IMPORTANTE:  Usted debe contactar a atwood doctor si le lleva más de 2 horas sentir 10 movimientos de atwood bebé.    Cada mañana, escriba la hora de inicio y comience a contar los movimientos de atwood bebé.  Hágalo colocándole un check o palomita a cada cuadrito cada vez que sienta un movimiento de atwood bebé.  Cuando haya sentido 10 “pataditas”, escriba la hora en que terminó de contar en la última columna.  Luego, complete en la cajita (arriba de la kalia de check o palomita) el número total de horas que le llevó hacerlo.  Asegúrese de leer completamente las instrucciones en la página anterior.

## 2021-11-16 NOTE — NON-PROVIDER
Pt here today for OB follow up  Pt states no complaints   Reports +FM  Good # [phone]  Pharmacy Confirmed.  Chaperone offered and not indicated.   Pt given 1 hr GTT, and instructions.  Pt given CAROL ANN sheet and instructions.  Pt would like to think about BTL, ask at next visit.   Pt declines TDAP vaccine  Pt would like FLU vaccine, consent signed   Flu vaccine given. RIGHT  Deltoid. VIS given and screening check list reviewed with AM.

## 2021-11-16 NOTE — PROGRESS NOTES
Injection administered by Medical Assistant under supervision of MD in clinic.      Patient will be scheduled for TOLAC counseling at upcoming visit. She most desires vaginal birth. Reviewed her ARIANNA which is based on LMP.

## 2021-12-01 ENCOUNTER — ROUTINE PRENATAL (OUTPATIENT)
Dept: OBGYN | Facility: CLINIC | Age: 36
End: 2021-12-01

## 2021-12-01 VITALS — DIASTOLIC BLOOD PRESSURE: 74 MMHG | SYSTOLIC BLOOD PRESSURE: 112 MMHG | BODY MASS INDEX: 30.99 KG/M2 | WEIGHT: 158.7 LBS

## 2021-12-01 DIAGNOSIS — O28.3 ABNORMAL PREGNANCY US: ICD-10-CM

## 2021-12-01 DIAGNOSIS — O09.522 ADVANCED MATERNAL AGE IN MULTIGRAVIDA, SECOND TRIMESTER: ICD-10-CM

## 2021-12-01 DIAGNOSIS — Z98.891 HISTORY OF C-SECTION: ICD-10-CM

## 2021-12-01 DIAGNOSIS — R87.610 ASCUS OF CERVIX WITH NEGATIVE HIGH RISK HPV: ICD-10-CM

## 2021-12-01 PROCEDURE — 90040 PR PRENATAL FOLLOW UP: CPT | Performed by: OBSTETRICS & GYNECOLOGY

## 2021-12-01 NOTE — LETTER
"Count Your Baby's Movements  Another step to a healthy delivery    Molly Jimenez             Dept: [phone]    How Many Weeks Pregnant:30w4d    Date to Begin Countin2021              How to use this chart    One way for your physician to keep track of your baby's health is by knowing how often the baby moves (or \"kicks\") in your womb.  You can help your physician to do this by using this chart every day.    Every day, you should see how many hours it takes for your baby to move 10 times.  Start in the morning, as soon as you get up.    · First, write down the time your baby moves until you get to 10.  · Check off one box every time your baby moves until you get to 10.  · Write down the time you finished counting in the last column.  · Total how long it took to count up all 10 movements.  · Finally, fill in the box that shows how long this took.  After counting 10 movements, you no longer have to count any more that day.  The next morning, just start counting again as soon as you get up.    What should you call a \"movement\"?  It is hard to say, because it will feel different from one mother to another and from one pregnancy to the next.  The important thing is that you count the movements the same way throughout your pregnancy.  If you have more questions, you should ask your physician.    Count carefully every day!  SAMPLE:  Week 28    How many hours did it take to feel 10 movements?       Start  Time     1     2     3     4     5     6     7     8     9     10   Finish Time   Mon 8:20 ·  ·  ·  ·  ·  ·  ·  ·  ·  ·  11:40                  Sat               Sun                 IMPORTANT: You should contact your physician if it takes more than two hours for you to feel 10 movements.  Each morning, write down the time and start to count the movements of your baby.  Keep track by checking off one box every time you feel one movement.  When " "you have felt 10 \"kicks\", write down the time you finished counting in the last column.  Then fill in the   box (over the check jeevan) for the number of hours it took.  Be sure to read the complete instructions on the previous page.            "

## 2021-12-01 NOTE — PROGRESS NOTES
Today, I discussed with Molly her history of  delivery which was done for fetal intolerance at 41 weeks.  Pt denies being told that she should never try for a vaginal delivery in future pregnancies.  We discussed the risks/benefits of TOLAC, including uterine rupture (approximately 0.8%) with need for emergent  delivery, risk of maternal hemorrhage requiring blood transfusion or, rarely, hysterectomy, risk of fetal death or permanent hypoxic brain injury) vs repeat  delivery including surgical risks (damage to intraabdominal structures, bleeding, infection, pain), possible need for  delivery in future pregnancies, risk of adherent placenta in future pregnancies.  Also discussed that if she needs induction with pitocin, it does increase the uterine rupture rate to 1.1 percent.   After our discussion, pt wants to have a repeat  section.

## 2021-12-15 ENCOUNTER — ROUTINE PRENATAL (OUTPATIENT)
Dept: OBGYN | Facility: CLINIC | Age: 36
End: 2021-12-15

## 2021-12-15 VITALS — BODY MASS INDEX: 31.25 KG/M2 | DIASTOLIC BLOOD PRESSURE: 68 MMHG | SYSTOLIC BLOOD PRESSURE: 118 MMHG | WEIGHT: 160 LBS

## 2021-12-15 DIAGNOSIS — O09.523 MULTIGRAVIDA OF ADVANCED MATERNAL AGE IN THIRD TRIMESTER: Primary | ICD-10-CM

## 2021-12-15 PROCEDURE — 90040 PR PRENATAL FOLLOW UP: CPT | Performed by: NURSE PRACTITIONER

## 2021-12-15 NOTE — PROGRESS NOTES
Pt. Here for OB/FU. Reports Good FM.   Good # [phone]  Pt states having pelvic pain and pressure.  Pharmacy verified.   Chaperone offered and not needed.    BTL offered and declined

## 2021-12-30 ENCOUNTER — ROUTINE PRENATAL (OUTPATIENT)
Dept: OBGYN | Facility: CLINIC | Age: 36
End: 2021-12-30

## 2021-12-30 VITALS — SYSTOLIC BLOOD PRESSURE: 122 MMHG | WEIGHT: 164 LBS | DIASTOLIC BLOOD PRESSURE: 68 MMHG | BODY MASS INDEX: 32.03 KG/M2

## 2021-12-30 DIAGNOSIS — O09.93 ENCOUNTER FOR SUPERVISION OF HIGH RISK PREGNANCY IN THIRD TRIMESTER, ANTEPARTUM: Primary | ICD-10-CM

## 2021-12-30 PROCEDURE — 90040 PR PRENATAL FOLLOW UP: CPT | Performed by: NURSE PRACTITIONER

## 2021-12-30 NOTE — PROGRESS NOTES
OB follow up   + fetal movement.  No VB, LOF or UC's.  Phone # [phone]  Preferred pharmacy confirmed.  C/o feeling dizzy for 2 days, states she can not hear from L ear. Denies any fever.

## 2021-12-30 NOTE — PROGRESS NOTES
S:Pt reports unable to hear out of LT ear. No fever or pain. Positive FM. NO bleeding, LOF or regular CTX    O: See above vitals  Ear: No swelling. No redness noted, membrane grey, intact    Desires R C/S with BTL-referral sent  Plan for NST weekly starting with next visit  Advised to go to Urgent Care if ear becomes painful or if she develops fever

## 2022-01-05 ENCOUNTER — HOSPITAL ENCOUNTER (OUTPATIENT)
Dept: LAB | Facility: MEDICAL CENTER | Age: 37
End: 2022-01-05
Attending: NURSE PRACTITIONER
Payer: COMMERCIAL

## 2022-01-05 ENCOUNTER — ROUTINE PRENATAL (OUTPATIENT)
Dept: OBGYN | Facility: CLINIC | Age: 37
End: 2022-01-05

## 2022-01-05 ENCOUNTER — APPOINTMENT (OUTPATIENT)
Dept: OBGYN | Facility: CLINIC | Age: 37
End: 2022-01-05

## 2022-01-05 VITALS — SYSTOLIC BLOOD PRESSURE: 122 MMHG | BODY MASS INDEX: 32.22 KG/M2 | WEIGHT: 165 LBS | DIASTOLIC BLOOD PRESSURE: 68 MMHG

## 2022-01-05 DIAGNOSIS — O09.523 MULTIGRAVIDA OF ADVANCED MATERNAL AGE IN THIRD TRIMESTER: ICD-10-CM

## 2022-01-05 DIAGNOSIS — O09.523 MULTIGRAVIDA OF ADVANCED MATERNAL AGE IN THIRD TRIMESTER: Primary | ICD-10-CM

## 2022-01-05 LAB
AST SERPL-CCNC: 11 U/L (ref 12–45)
BUN SERPL-MCNC: 5 MG/DL (ref 8–22)
CREAT SERPL-MCNC: 0.33 MG/DL (ref 0.5–1.4)
CREAT UR-MCNC: 50.81 MG/DL
ERYTHROCYTE [DISTWIDTH] IN BLOOD BY AUTOMATED COUNT: 45.9 FL (ref 35.9–50)
HCT VFR BLD AUTO: 39.7 % (ref 37–47)
HGB BLD-MCNC: 12.9 G/DL (ref 12–16)
MCH RBC QN AUTO: 29.3 PG (ref 27–33)
MCHC RBC AUTO-ENTMCNC: 32.5 G/DL (ref 33.6–35)
MCV RBC AUTO: 90 FL (ref 81.4–97.8)
PLATELET # BLD AUTO: 265 K/UL (ref 164–446)
PMV BLD AUTO: 11 FL (ref 9–12.9)
PROT UR-MCNC: 6 MG/DL (ref 0–15)
PROT/CREAT UR: 118 MG/G (ref 10–107)
RBC # BLD AUTO: 4.41 M/UL (ref 4.2–5.4)
URATE SERPL-MCNC: 4 MG/DL (ref 1.9–8.2)
WBC # BLD AUTO: 9.7 K/UL (ref 4.8–10.8)

## 2022-01-05 PROCEDURE — 90040 PR PRENATAL FOLLOW UP: CPT | Performed by: NURSE PRACTITIONER

## 2022-01-05 NOTE — PROGRESS NOTES
Pt. Here for OB/FU and NST. Reports Good FM.   Good # [phone]  Pt states having contractions that come and go at night, headaches x 2 days and on Saturday had swelling of left leg and hand.   Pharmacy verified.   Chaperone offered and not needed.

## 2022-01-13 ENCOUNTER — ROUTINE PRENATAL (OUTPATIENT)
Dept: OBGYN | Facility: CLINIC | Age: 37
End: 2022-01-13

## 2022-01-13 ENCOUNTER — HOSPITAL ENCOUNTER (OUTPATIENT)
Facility: MEDICAL CENTER | Age: 37
End: 2022-01-13
Attending: NURSE PRACTITIONER
Payer: COMMERCIAL

## 2022-01-13 ENCOUNTER — NON-PROVIDER VISIT (OUTPATIENT)
Dept: OBGYN | Facility: CLINIC | Age: 37
End: 2022-01-13

## 2022-01-13 VITALS — WEIGHT: 165 LBS | DIASTOLIC BLOOD PRESSURE: 75 MMHG | SYSTOLIC BLOOD PRESSURE: 112 MMHG | BODY MASS INDEX: 32.22 KG/M2

## 2022-01-13 DIAGNOSIS — O09.523 MULTIGRAVIDA OF ADVANCED MATERNAL AGE IN THIRD TRIMESTER: ICD-10-CM

## 2022-01-13 DIAGNOSIS — O09.93 ENCOUNTER FOR SUPERVISION OF HIGH RISK PREGNANCY IN THIRD TRIMESTER, ANTEPARTUM: Primary | ICD-10-CM

## 2022-01-13 PROCEDURE — 90040 PR PRENATAL FOLLOW UP: CPT | Performed by: NURSE PRACTITIONER

## 2022-01-13 NOTE — PROGRESS NOTES
OB follow up   + fetal movement.  No VB or LOF   Irregular UC's  [phone] (home)   Preferred pharmacy confirmed.  Pt states she has been having a lot of pain and pressure in the evenings  NST today

## 2022-01-14 DIAGNOSIS — O09.93 ENCOUNTER FOR SUPERVISION OF HIGH RISK PREGNANCY IN THIRD TRIMESTER, ANTEPARTUM: ICD-10-CM

## 2022-01-15 LAB — GP B STREP DNA SPEC QL NAA+PROBE: NEGATIVE

## 2022-01-20 ENCOUNTER — HOSPITAL ENCOUNTER (EMERGENCY)
Facility: MEDICAL CENTER | Age: 37
End: 2022-01-20
Attending: OBSTETRICS & GYNECOLOGY | Admitting: OBSTETRICS & GYNECOLOGY
Payer: COMMERCIAL

## 2022-01-20 ENCOUNTER — ROUTINE PRENATAL (OUTPATIENT)
Dept: OBGYN | Facility: CLINIC | Age: 37
End: 2022-01-20

## 2022-01-20 VITALS
WEIGHT: 167 LBS | BODY MASS INDEX: 32.79 KG/M2 | RESPIRATION RATE: 17 BRPM | DIASTOLIC BLOOD PRESSURE: 88 MMHG | HEIGHT: 60 IN | SYSTOLIC BLOOD PRESSURE: 125 MMHG | HEART RATE: 71 BPM

## 2022-01-20 VITALS — DIASTOLIC BLOOD PRESSURE: 76 MMHG | SYSTOLIC BLOOD PRESSURE: 123 MMHG | WEIGHT: 167 LBS | BODY MASS INDEX: 32.61 KG/M2

## 2022-01-20 DIAGNOSIS — O09.522 ADVANCED MATERNAL AGE IN MULTIGRAVIDA, SECOND TRIMESTER: Primary | ICD-10-CM

## 2022-01-20 DIAGNOSIS — O09.523 MULTIGRAVIDA OF ADVANCED MATERNAL AGE IN THIRD TRIMESTER: Primary | ICD-10-CM

## 2022-01-20 LAB
ALBUMIN SERPL BCP-MCNC: 3.3 G/DL (ref 3.2–4.9)
ALBUMIN/GLOB SERPL: 0.9 G/DL
ALP SERPL-CCNC: 149 U/L (ref 30–99)
ALT SERPL-CCNC: 16 U/L (ref 2–50)
ANION GAP SERPL CALC-SCNC: 10 MMOL/L (ref 7–16)
AST SERPL-CCNC: 17 U/L (ref 12–45)
BASOPHILS # BLD AUTO: 0.3 % (ref 0–1.8)
BASOPHILS # BLD: 0.03 K/UL (ref 0–0.12)
BILIRUB SERPL-MCNC: <0.2 MG/DL (ref 0.1–1.5)
BUN SERPL-MCNC: 10 MG/DL (ref 8–22)
CALCIUM SERPL-MCNC: 8.9 MG/DL (ref 8.5–10.5)
CHLORIDE SERPL-SCNC: 105 MMOL/L (ref 96–112)
CO2 SERPL-SCNC: 20 MMOL/L (ref 20–33)
CREAT SERPL-MCNC: 0.55 MG/DL (ref 0.5–1.4)
CREAT UR-MCNC: 46.71 MG/DL
EOSINOPHIL # BLD AUTO: 0.11 K/UL (ref 0–0.51)
EOSINOPHIL NFR BLD: 1.1 % (ref 0–6.9)
ERYTHROCYTE [DISTWIDTH] IN BLOOD BY AUTOMATED COUNT: 43.5 FL (ref 35.9–50)
GLOBULIN SER CALC-MCNC: 3.5 G/DL (ref 1.9–3.5)
GLUCOSE SERPL-MCNC: 84 MG/DL (ref 65–99)
HCT VFR BLD AUTO: 38.7 % (ref 37–47)
HGB BLD-MCNC: 13.3 G/DL (ref 12–16)
IMM GRANULOCYTES # BLD AUTO: 0.04 K/UL (ref 0–0.11)
IMM GRANULOCYTES NFR BLD AUTO: 0.4 % (ref 0–0.9)
LYMPHOCYTES # BLD AUTO: 2.25 K/UL (ref 1–4.8)
LYMPHOCYTES NFR BLD: 23.1 % (ref 22–41)
MCH RBC QN AUTO: 30 PG (ref 27–33)
MCHC RBC AUTO-ENTMCNC: 34.4 G/DL (ref 33.6–35)
MCV RBC AUTO: 87.4 FL (ref 81.4–97.8)
MONOCYTES # BLD AUTO: 0.59 K/UL (ref 0–0.85)
MONOCYTES NFR BLD AUTO: 6.1 % (ref 0–13.4)
NEUTROPHILS # BLD AUTO: 6.73 K/UL (ref 2–7.15)
NEUTROPHILS NFR BLD: 69 % (ref 44–72)
NRBC # BLD AUTO: 0 K/UL
NRBC BLD-RTO: 0 /100 WBC
PLATELET # BLD AUTO: 232 K/UL (ref 164–446)
PMV BLD AUTO: 10.2 FL (ref 9–12.9)
POTASSIUM SERPL-SCNC: 3.9 MMOL/L (ref 3.6–5.5)
PROT SERPL-MCNC: 6.8 G/DL (ref 6–8.2)
PROT UR-MCNC: 8 MG/DL (ref 0–15)
PROT/CREAT UR: 171 MG/G (ref 10–107)
RBC # BLD AUTO: 4.43 M/UL (ref 4.2–5.4)
SODIUM SERPL-SCNC: 135 MMOL/L (ref 135–145)
URATE SERPL-MCNC: 4.3 MG/DL (ref 1.9–8.2)
WBC # BLD AUTO: 9.8 K/UL (ref 4.8–10.8)

## 2022-01-20 PROCEDURE — 59025 FETAL NON-STRESS TEST: CPT | Performed by: NURSE PRACTITIONER

## 2022-01-20 PROCEDURE — 84156 ASSAY OF PROTEIN URINE: CPT

## 2022-01-20 PROCEDURE — 36415 COLL VENOUS BLD VENIPUNCTURE: CPT

## 2022-01-20 PROCEDURE — 80053 COMPREHEN METABOLIC PANEL: CPT

## 2022-01-20 PROCEDURE — 85025 COMPLETE CBC W/AUTO DIFF WBC: CPT

## 2022-01-20 PROCEDURE — 82570 ASSAY OF URINE CREATININE: CPT

## 2022-01-20 PROCEDURE — 59025 FETAL NON-STRESS TEST: CPT | Mod: 26 | Performed by: NURSE PRACTITIONER

## 2022-01-20 PROCEDURE — 84550 ASSAY OF BLOOD/URIC ACID: CPT

## 2022-01-20 PROCEDURE — 90040 PR PRENATAL FOLLOW UP: CPT | Performed by: NURSE PRACTITIONER

## 2022-01-20 PROCEDURE — 59025 FETAL NON-STRESS TEST: CPT

## 2022-01-20 PROCEDURE — 99282 EMERGENCY DEPT VISIT SF MDM: CPT | Mod: 25 | Performed by: NURSE PRACTITIONER

## 2022-01-20 PROCEDURE — 302449 STATCHG TRIAGE ONLY (STATISTIC)

## 2022-01-20 ASSESSMENT — PAIN SCALES - GENERAL: PAINLEVEL: 2

## 2022-01-20 NOTE — PROGRESS NOTES
S:  Reports some slight spotting.  Reports good FM.  Denies VB, LOF, RUCs, or vaginal DC.     O:    Vitals:    01/20/22 1441   BP: 123/76   Weight: 75.8 kg (167 lb)     See flow sheet.    A:  IUP at 37w5d  Patient Active Problem List    Diagnosis Date Noted   • Abnormal pregnancy US - nasal bones absent - AFP wnl, no f/u needed 09/20/2021   • ASCUS of cervix with negative high risk HPV 08/31/2021   • History of C/S x 1 for NRFHT - desires R C/S with BTL 08/13/2021   • Advanced maternal age in multigravida, second trimester 08/13/2021       P:  1.  Continue FKCs.         2.  Labor precautions given.  Instructions given on where to go.  Pt receptive to education.         3.  D/w pt RCS policy.  Patient is scheduled for C/S on 1/29/22 with Dr. Thakkar at 2pm.       4.  Questions answered.         5.  Encouraged adequate water intake       6.  F/u 1wk as scheduled. Cont weekly NST.       7.  GBS neg - reviewed w pt.       8.  L&D policies reviewed.    Chaperone offered and provided by Lillie Nino MA.

## 2022-01-20 NOTE — PROGRESS NOTES
OB follow up   + fetal movement.  No LOF or UC's.  Pt states she has some spotting. Pt did confirm sexual activity  [phone] (home)   Preferred pharmacy confirmed.  GBS Negative

## 2022-01-21 ENCOUNTER — TELEPHONE (OUTPATIENT)
Dept: OBGYN | Facility: CLINIC | Age: 37
End: 2022-01-21

## 2022-01-21 NOTE — ED PROVIDER NOTES
Emergency Obstetric Consultation     Date of Service  2022    Reason for Consultation  Chief Complaint   Patient presents with   • Other     Spotting and elevated BP at home       PATIENT ID:  NAME:  Molly Jiemnez  MRN:               [id number]  YOB: 1985     36 y.o. female  at 37w5d.    Subjective: Pt reports spotting and one elevated BP at home  Pregnancy complicated by Hx of C/S, AMA, abnormal US    Negative For CTXS.   Negative Feels pain   negative for LOF  positive for vaginal spotting.   positive for fetal movement    ROS: Patient denies any fever chills, nausea, vomiting, headache, chest pain, shortness of breath, or dysuria or unusual swelling of hands or feet.     Objective:    Vitals:    22   BP: 121/77 124/79 124/81 125/88   Pulse: 70 81 63 71   Resp:       Weight:       Height:         No data recorded.    General: No acute distress, resting comfortably in bed.  HEENT: normocephalic, nontraumatic, PERRLA, EOMI  Cardiovascular: Heart RRR with no murmurs, rubs or gallops. Distal Pulses 2+  Respiratory: symmetric chest expansion, lungs CTAB, with no wheezes, rales, rhonci  Abdomen: gravid, nontender  Musculoskeletal: strength 5/5 in four extremities  Neuro: non focal with no numbness, tingling or changes in sensation    Cervix:  1cm/10%/-3  Ammon: Uterine Contractions: irrit.   FHRM: Baseline 130, Accels to 150, no decels, moderate variability    CBC wnl  CMP wnl  Protein/creatinine 171    Assessment: 36 y.o. female  at 37w5d.    NST reactive per my read    Plan:   1. Patient is cleared to return home.   2. F/U in office for scheduled NST and KELLY visit  3. Instructions for labor given      VANESSA Pace

## 2022-01-21 NOTE — PROGRESS NOTES
1945- Pt Presents to L&D with c/o spotting and 1 elevated BP at home. Denies any LOFor CTXs. Notes some lower back discomfort. Denies any complications with this pregnancy. EFM applied, + FM. Vitals WNL. SVE checked.       1954- Report given to WILFRIDO Santos CNM. Orders received for PIH workup. If labs are normal patient may d/c home with labor precautions.     2108- PIH workup WNL, pt may d/c home per orders.  D/C instructions given at bedside, pt verbalized understanding. All questions and concerns were answered.     2118- Pt left ambulatory in stable condition, accompanied by her .

## 2022-01-21 NOTE — TELEPHONE ENCOUNTER
Pt called stating she had cervical exam yesterday and had some spotting and today is having brown discharge. Adv pt that is normal after cervical exam. Labor precautions given.

## 2022-01-26 ENCOUNTER — NON-PROVIDER VISIT (OUTPATIENT)
Dept: OBGYN | Facility: CLINIC | Age: 37
End: 2022-01-26
Payer: COMMERCIAL

## 2022-01-26 ENCOUNTER — ROUTINE PRENATAL (OUTPATIENT)
Dept: OBGYN | Facility: CLINIC | Age: 37
End: 2022-01-26
Payer: COMMERCIAL

## 2022-01-26 VITALS — BODY MASS INDEX: 32.61 KG/M2 | DIASTOLIC BLOOD PRESSURE: 76 MMHG | WEIGHT: 167 LBS | SYSTOLIC BLOOD PRESSURE: 112 MMHG

## 2022-01-26 DIAGNOSIS — O28.3 ABNORMAL PREGNANCY US: ICD-10-CM

## 2022-01-26 DIAGNOSIS — O09.523 MULTIGRAVIDA OF ADVANCED MATERNAL AGE IN THIRD TRIMESTER: ICD-10-CM

## 2022-01-26 PROCEDURE — 90040 PR PRENATAL FOLLOW UP: CPT | Mod: 25 | Performed by: OBSTETRICS & GYNECOLOGY

## 2022-01-26 PROCEDURE — 59025 FETAL NON-STRESS TEST: CPT | Performed by: OBSTETRICS & GYNECOLOGY

## 2022-01-26 ASSESSMENT — FIBROSIS 4 INDEX: FIB4 SCORE: 0.66

## 2022-01-26 NOTE — PROGRESS NOTES
S: No contractions, leaking of fluid, vaginal bleeding.  The fetus is active.    O:/76   Wt 75.8 kg (167 lb)      GBS neg  NST cat 1    A/P: 36-year-old  3 para 2-0-0-2 at 38-4/7 weeks  History of previous  section -desires repeat    Discussed with the patient indications for  delivery. The patient voiced understanding of indications for  section at this time and declines trial of labor.    Discussed with the patient the risks of  delivery. The risks include bleeding, infection, transfusion, emergency hysterectomy to control bleeding, damage to surrounding organs (bowel, bladder, ureters, nerves, vessels), need for repair or future surgery, fetal injury, unexpected pathology, anesthesia risks, and rarely death.  I also discussed with the patient the risk of wound infection, wound breakdown, and scarring. We discussed that these risks are greater in people that have diabetes or obesity.    The patient had the opportunity to ask questions regarding the procedure. All questions answered to the patient's satisfaction. Plan to proceed with  delivery scheduled on 22 at 2 pm. NPO 8 hours prior to procedure.  Labor/SROM precautions reviewed.

## 2022-01-29 ENCOUNTER — ANESTHESIA EVENT (OUTPATIENT)
Dept: OBGYN | Facility: MEDICAL CENTER | Age: 37
End: 2022-01-29
Payer: COMMERCIAL

## 2022-01-29 ENCOUNTER — HOSPITAL ENCOUNTER (INPATIENT)
Facility: MEDICAL CENTER | Age: 37
LOS: 2 days | End: 2022-01-31
Attending: OBSTETRICS & GYNECOLOGY | Admitting: OBSTETRICS & GYNECOLOGY
Payer: COMMERCIAL

## 2022-01-29 ENCOUNTER — ANESTHESIA (OUTPATIENT)
Dept: OBGYN | Facility: MEDICAL CENTER | Age: 37
End: 2022-01-29
Payer: COMMERCIAL

## 2022-01-29 LAB
ALBUMIN SERPL BCP-MCNC: 3.4 G/DL (ref 3.2–4.9)
ALBUMIN/GLOB SERPL: 1.1 G/DL
ALP SERPL-CCNC: 140 U/L (ref 30–99)
ALT SERPL-CCNC: 17 U/L (ref 2–50)
ANION GAP SERPL CALC-SCNC: 11 MMOL/L (ref 7–16)
AST SERPL-CCNC: 21 U/L (ref 12–45)
BASOPHILS # BLD AUTO: 0.4 % (ref 0–1.8)
BASOPHILS # BLD: 0.04 K/UL (ref 0–0.12)
BILIRUB SERPL-MCNC: <0.2 MG/DL (ref 0.1–1.5)
BUN SERPL-MCNC: 8 MG/DL (ref 8–22)
CALCIUM SERPL-MCNC: 8.6 MG/DL (ref 8.5–10.5)
CHLORIDE SERPL-SCNC: 105 MMOL/L (ref 96–112)
CO2 SERPL-SCNC: 21 MMOL/L (ref 20–33)
CREAT SERPL-MCNC: 0.43 MG/DL (ref 0.5–1.4)
CREAT UR-MCNC: 60.09 MG/DL
EOSINOPHIL # BLD AUTO: 0.06 K/UL (ref 0–0.51)
EOSINOPHIL NFR BLD: 0.6 % (ref 0–6.9)
ERYTHROCYTE [DISTWIDTH] IN BLOOD BY AUTOMATED COUNT: 44.5 FL (ref 35.9–50)
GLOBULIN SER CALC-MCNC: 3 G/DL (ref 1.9–3.5)
GLUCOSE SERPL-MCNC: 90 MG/DL (ref 65–99)
HCT VFR BLD AUTO: 40.2 % (ref 37–47)
HGB BLD-MCNC: 13.7 G/DL (ref 12–16)
HOLDING TUBE BB 8507: NORMAL
IMM GRANULOCYTES # BLD AUTO: 0.06 K/UL (ref 0–0.11)
IMM GRANULOCYTES NFR BLD AUTO: 0.6 % (ref 0–0.9)
LYMPHOCYTES # BLD AUTO: 1.95 K/UL (ref 1–4.8)
LYMPHOCYTES NFR BLD: 18.6 % (ref 22–41)
MCH RBC QN AUTO: 29.8 PG (ref 27–33)
MCHC RBC AUTO-ENTMCNC: 34.1 G/DL (ref 33.6–35)
MCV RBC AUTO: 87.6 FL (ref 81.4–97.8)
MONOCYTES # BLD AUTO: 0.55 K/UL (ref 0–0.85)
MONOCYTES NFR BLD AUTO: 5.2 % (ref 0–13.4)
NEUTROPHILS # BLD AUTO: 7.85 K/UL (ref 2–7.15)
NEUTROPHILS NFR BLD: 74.6 % (ref 44–72)
NRBC # BLD AUTO: 0 K/UL
NRBC BLD-RTO: 0 /100 WBC
PLATELET # BLD AUTO: 250 K/UL (ref 164–446)
PMV BLD AUTO: 10.5 FL (ref 9–12.9)
POTASSIUM SERPL-SCNC: 4.4 MMOL/L (ref 3.6–5.5)
PROT SERPL-MCNC: 6.4 G/DL (ref 6–8.2)
PROT UR-MCNC: 18 MG/DL (ref 0–15)
PROT/CREAT UR: 300 MG/G (ref 10–107)
RBC # BLD AUTO: 4.59 M/UL (ref 4.2–5.4)
SARS-COV+SARS-COV-2 AG RESP QL IA.RAPID: NOTDETECTED
SODIUM SERPL-SCNC: 137 MMOL/L (ref 135–145)
SPECIMEN SOURCE: NORMAL
WBC # BLD AUTO: 10.5 K/UL (ref 4.8–10.8)

## 2022-01-29 PROCEDURE — 160002 HCHG RECOVERY MINUTES (STAT): Performed by: OBSTETRICS & GYNECOLOGY

## 2022-01-29 PROCEDURE — 36415 COLL VENOUS BLD VENIPUNCTURE: CPT

## 2022-01-29 PROCEDURE — 87426 SARSCOV CORONAVIRUS AG IA: CPT

## 2022-01-29 PROCEDURE — 160048 HCHG OR STATISTICAL LEVEL 1-5: Performed by: OBSTETRICS & GYNECOLOGY

## 2022-01-29 PROCEDURE — 700111 HCHG RX REV CODE 636 W/ 250 OVERRIDE (IP): Performed by: OBSTETRICS & GYNECOLOGY

## 2022-01-29 PROCEDURE — 160009 HCHG ANES TIME/MIN: Performed by: OBSTETRICS & GYNECOLOGY

## 2022-01-29 PROCEDURE — 80053 COMPREHEN METABOLIC PANEL: CPT

## 2022-01-29 PROCEDURE — 700105 HCHG RX REV CODE 258: Performed by: ANESTHESIOLOGY

## 2022-01-29 PROCEDURE — 59514 CESAREAN DELIVERY ONLY: CPT | Performed by: OBSTETRICS & GYNECOLOGY

## 2022-01-29 PROCEDURE — 82570 ASSAY OF URINE CREATININE: CPT

## 2022-01-29 PROCEDURE — 160035 HCHG PACU - 1ST 60 MINS PHASE I: Performed by: OBSTETRICS & GYNECOLOGY

## 2022-01-29 PROCEDURE — 160041 HCHG SURGERY MINUTES - EA ADDL 1 MIN LEVEL 4: Performed by: OBSTETRICS & GYNECOLOGY

## 2022-01-29 PROCEDURE — A9270 NON-COVERED ITEM OR SERVICE: HCPCS | Performed by: OBSTETRICS & GYNECOLOGY

## 2022-01-29 PROCEDURE — 700102 HCHG RX REV CODE 250 W/ 637 OVERRIDE(OP): Performed by: ANESTHESIOLOGY

## 2022-01-29 PROCEDURE — 700101 HCHG RX REV CODE 250: Performed by: ANESTHESIOLOGY

## 2022-01-29 PROCEDURE — 84156 ASSAY OF PROTEIN URINE: CPT

## 2022-01-29 PROCEDURE — 85025 COMPLETE CBC W/AUTO DIFF WBC: CPT

## 2022-01-29 PROCEDURE — 700111 HCHG RX REV CODE 636 W/ 250 OVERRIDE (IP): Performed by: ANESTHESIOLOGY

## 2022-01-29 PROCEDURE — A9270 NON-COVERED ITEM OR SERVICE: HCPCS | Performed by: ANESTHESIOLOGY

## 2022-01-29 PROCEDURE — 700102 HCHG RX REV CODE 250 W/ 637 OVERRIDE(OP): Performed by: OBSTETRICS & GYNECOLOGY

## 2022-01-29 PROCEDURE — 770002 HCHG ROOM/CARE - OB PRIVATE (112)

## 2022-01-29 PROCEDURE — 700105 HCHG RX REV CODE 258: Performed by: OBSTETRICS & GYNECOLOGY

## 2022-01-29 PROCEDURE — 59514 CESAREAN DELIVERY ONLY: CPT | Mod: 80

## 2022-01-29 PROCEDURE — 160029 HCHG SURGERY MINUTES - 1ST 30 MINS LEVEL 4: Performed by: OBSTETRICS & GYNECOLOGY

## 2022-01-29 PROCEDURE — 85027 COMPLETE CBC AUTOMATED: CPT

## 2022-01-29 RX ORDER — DEXAMETHASONE SODIUM PHOSPHATE 4 MG/ML
INJECTION, SOLUTION INTRA-ARTICULAR; INTRALESIONAL; INTRAMUSCULAR; INTRAVENOUS; SOFT TISSUE PRN
Status: DISCONTINUED | OUTPATIENT
Start: 2022-01-29 | End: 2022-01-29 | Stop reason: SURG

## 2022-01-29 RX ORDER — OXYCODONE HYDROCHLORIDE 5 MG/1
5 TABLET ORAL EVERY 4 HOURS PRN
Status: DISCONTINUED | OUTPATIENT
Start: 2022-01-29 | End: 2022-01-30

## 2022-01-29 RX ORDER — CEFAZOLIN SODIUM 1 G/3ML
2 INJECTION, POWDER, FOR SOLUTION INTRAMUSCULAR; INTRAVENOUS ONCE
Status: COMPLETED | OUTPATIENT
Start: 2022-01-29 | End: 2022-01-29

## 2022-01-29 RX ORDER — HYDROMORPHONE HYDROCHLORIDE 1 MG/ML
0.1 INJECTION, SOLUTION INTRAMUSCULAR; INTRAVENOUS; SUBCUTANEOUS
Status: DISCONTINUED | OUTPATIENT
Start: 2022-01-29 | End: 2022-01-29 | Stop reason: HOSPADM

## 2022-01-29 RX ORDER — CITRIC ACID/SODIUM CITRATE 334-500MG
30 SOLUTION, ORAL ORAL ONCE
Status: COMPLETED | OUTPATIENT
Start: 2022-01-29 | End: 2022-01-29

## 2022-01-29 RX ORDER — ONDANSETRON 2 MG/ML
4 INJECTION INTRAMUSCULAR; INTRAVENOUS EVERY 6 HOURS PRN
Status: DISCONTINUED | OUTPATIENT
Start: 2022-01-29 | End: 2022-01-30

## 2022-01-29 RX ORDER — HYDROMORPHONE HYDROCHLORIDE 1 MG/ML
0.2 INJECTION, SOLUTION INTRAMUSCULAR; INTRAVENOUS; SUBCUTANEOUS
Status: DISCONTINUED | OUTPATIENT
Start: 2022-01-29 | End: 2022-01-29 | Stop reason: HOSPADM

## 2022-01-29 RX ORDER — KETOROLAC TROMETHAMINE 30 MG/ML
30 INJECTION, SOLUTION INTRAMUSCULAR; INTRAVENOUS EVERY 6 HOURS
Status: DISCONTINUED | OUTPATIENT
Start: 2022-01-29 | End: 2022-01-30

## 2022-01-29 RX ORDER — DIPHENHYDRAMINE HYDROCHLORIDE 50 MG/ML
12.5 INJECTION INTRAMUSCULAR; INTRAVENOUS EVERY 6 HOURS PRN
Status: DISCONTINUED | OUTPATIENT
Start: 2022-01-29 | End: 2022-01-30

## 2022-01-29 RX ORDER — SODIUM CHLORIDE, SODIUM LACTATE, POTASSIUM CHLORIDE, CALCIUM CHLORIDE 600; 310; 30; 20 MG/100ML; MG/100ML; MG/100ML; MG/100ML
INJECTION, SOLUTION INTRAVENOUS ONCE
Status: ACTIVE | OUTPATIENT
Start: 2022-01-29 | End: 2022-01-30

## 2022-01-29 RX ORDER — OXYCODONE HCL 5 MG/5 ML
5 SOLUTION, ORAL ORAL
Status: DISCONTINUED | OUTPATIENT
Start: 2022-01-29 | End: 2022-01-29 | Stop reason: HOSPADM

## 2022-01-29 RX ORDER — ONDANSETRON 2 MG/ML
4 INJECTION INTRAMUSCULAR; INTRAVENOUS
Status: DISCONTINUED | OUTPATIENT
Start: 2022-01-29 | End: 2022-01-29 | Stop reason: HOSPADM

## 2022-01-29 RX ORDER — DIPHENHYDRAMINE HYDROCHLORIDE 50 MG/ML
25 INJECTION INTRAMUSCULAR; INTRAVENOUS EVERY 6 HOURS PRN
Status: DISCONTINUED | OUTPATIENT
Start: 2022-01-29 | End: 2022-01-30

## 2022-01-29 RX ORDER — ONDANSETRON 2 MG/ML
INJECTION INTRAMUSCULAR; INTRAVENOUS PRN
Status: DISCONTINUED | OUTPATIENT
Start: 2022-01-29 | End: 2022-01-29 | Stop reason: SURG

## 2022-01-29 RX ORDER — HYDROMORPHONE HYDROCHLORIDE 1 MG/ML
0.2 INJECTION, SOLUTION INTRAMUSCULAR; INTRAVENOUS; SUBCUTANEOUS
Status: DISCONTINUED | OUTPATIENT
Start: 2022-01-29 | End: 2022-01-30

## 2022-01-29 RX ORDER — BUPIVACAINE HYDROCHLORIDE 7.5 MG/ML
INJECTION, SOLUTION INTRASPINAL
Status: COMPLETED | OUTPATIENT
Start: 2022-01-29 | End: 2022-01-29

## 2022-01-29 RX ORDER — HYDROMORPHONE HYDROCHLORIDE 1 MG/ML
0.4 INJECTION, SOLUTION INTRAMUSCULAR; INTRAVENOUS; SUBCUTANEOUS
Status: DISCONTINUED | OUTPATIENT
Start: 2022-01-29 | End: 2022-01-30

## 2022-01-29 RX ORDER — ACETAMINOPHEN 500 MG
1000 TABLET ORAL EVERY 6 HOURS
Status: COMPLETED | OUTPATIENT
Start: 2022-01-29 | End: 2022-01-30

## 2022-01-29 RX ORDER — HYDROMORPHONE HYDROCHLORIDE 1 MG/ML
0.4 INJECTION, SOLUTION INTRAMUSCULAR; INTRAVENOUS; SUBCUTANEOUS
Status: DISCONTINUED | OUTPATIENT
Start: 2022-01-29 | End: 2022-01-29 | Stop reason: HOSPADM

## 2022-01-29 RX ORDER — SODIUM CHLORIDE, SODIUM LACTATE, POTASSIUM CHLORIDE, CALCIUM CHLORIDE 600; 310; 30; 20 MG/100ML; MG/100ML; MG/100ML; MG/100ML
INJECTION, SOLUTION INTRAVENOUS PRN
Status: DISCONTINUED | OUTPATIENT
Start: 2022-01-29 | End: 2022-01-31 | Stop reason: HOSPADM

## 2022-01-29 RX ORDER — SODIUM CHLORIDE, SODIUM GLUCONATE, SODIUM ACETATE, POTASSIUM CHLORIDE AND MAGNESIUM CHLORIDE 526; 502; 368; 37; 30 MG/100ML; MG/100ML; MG/100ML; MG/100ML; MG/100ML
INJECTION, SOLUTION INTRAVENOUS
Status: DISCONTINUED | OUTPATIENT
Start: 2022-01-29 | End: 2022-01-29 | Stop reason: SURG

## 2022-01-29 RX ORDER — OXYTOCIN 10 [USP'U]/ML
10 INJECTION, SOLUTION INTRAMUSCULAR; INTRAVENOUS
Status: DISCONTINUED | OUTPATIENT
Start: 2022-01-29 | End: 2022-01-31 | Stop reason: HOSPADM

## 2022-01-29 RX ORDER — HALOPERIDOL 5 MG/ML
1 INJECTION INTRAMUSCULAR
Status: DISCONTINUED | OUTPATIENT
Start: 2022-01-29 | End: 2022-01-29 | Stop reason: HOSPADM

## 2022-01-29 RX ORDER — MORPHINE SULFATE 0.5 MG/ML
INJECTION, SOLUTION EPIDURAL; INTRATHECAL; INTRAVENOUS
Status: COMPLETED | OUTPATIENT
Start: 2022-01-29 | End: 2022-01-29

## 2022-01-29 RX ORDER — DOCUSATE SODIUM 100 MG/1
100 CAPSULE, LIQUID FILLED ORAL 2 TIMES DAILY PRN
Status: DISCONTINUED | OUTPATIENT
Start: 2022-01-29 | End: 2022-01-31 | Stop reason: HOSPADM

## 2022-01-29 RX ORDER — DIPHENHYDRAMINE HYDROCHLORIDE 50 MG/ML
12.5 INJECTION INTRAMUSCULAR; INTRAVENOUS
Status: DISCONTINUED | OUTPATIENT
Start: 2022-01-29 | End: 2022-01-29 | Stop reason: HOSPADM

## 2022-01-29 RX ORDER — OXYCODONE HYDROCHLORIDE 10 MG/1
10 TABLET ORAL EVERY 4 HOURS PRN
Status: DISCONTINUED | OUTPATIENT
Start: 2022-01-29 | End: 2022-01-30

## 2022-01-29 RX ORDER — OXYCODONE HCL 5 MG/5 ML
10 SOLUTION, ORAL ORAL
Status: DISCONTINUED | OUTPATIENT
Start: 2022-01-29 | End: 2022-01-29 | Stop reason: HOSPADM

## 2022-01-29 RX ORDER — METOCLOPRAMIDE HYDROCHLORIDE 5 MG/ML
10 INJECTION INTRAMUSCULAR; INTRAVENOUS ONCE
Status: COMPLETED | OUTPATIENT
Start: 2022-01-29 | End: 2022-01-29

## 2022-01-29 RX ORDER — SODIUM CHLORIDE, SODIUM LACTATE, POTASSIUM CHLORIDE, CALCIUM CHLORIDE 600; 310; 30; 20 MG/100ML; MG/100ML; MG/100ML; MG/100ML
INJECTION, SOLUTION INTRAVENOUS CONTINUOUS
Status: DISCONTINUED | OUTPATIENT
Start: 2022-01-29 | End: 2022-01-29

## 2022-01-29 RX ORDER — SODIUM CHLORIDE, SODIUM GLUCONATE, SODIUM ACETATE, POTASSIUM CHLORIDE AND MAGNESIUM CHLORIDE 526; 502; 368; 37; 30 MG/100ML; MG/100ML; MG/100ML; MG/100ML; MG/100ML
1500 INJECTION, SOLUTION INTRAVENOUS ONCE
Status: COMPLETED | OUTPATIENT
Start: 2022-01-29 | End: 2022-01-29

## 2022-01-29 RX ORDER — KETOROLAC TROMETHAMINE 30 MG/ML
INJECTION, SOLUTION INTRAMUSCULAR; INTRAVENOUS PRN
Status: DISCONTINUED | OUTPATIENT
Start: 2022-01-29 | End: 2022-01-29 | Stop reason: SURG

## 2022-01-29 RX ORDER — SODIUM CHLORIDE, SODIUM GLUCONATE, SODIUM ACETATE, POTASSIUM CHLORIDE AND MAGNESIUM CHLORIDE 526; 502; 368; 37; 30 MG/100ML; MG/100ML; MG/100ML; MG/100ML; MG/100ML
500 INJECTION, SOLUTION INTRAVENOUS CONTINUOUS
Status: DISCONTINUED | OUTPATIENT
Start: 2022-01-29 | End: 2022-01-29

## 2022-01-29 RX ADMIN — CEFAZOLIN 2 G: 330 INJECTION, POWDER, FOR SOLUTION INTRAMUSCULAR; INTRAVENOUS at 14:13

## 2022-01-29 RX ADMIN — ACETAMINOPHEN 1000 MG: 500 TABLET ORAL at 19:00

## 2022-01-29 RX ADMIN — KETOROLAC TROMETHAMINE 30 MG: 30 INJECTION, SOLUTION INTRAMUSCULAR at 14:50

## 2022-01-29 RX ADMIN — ONDANSETRON 4 MG: 2 INJECTION INTRAMUSCULAR; INTRAVENOUS at 19:00

## 2022-01-29 RX ADMIN — BUPIVACAINE HYDROCHLORIDE IN DEXTROSE 1.5 ML: 7.5 INJECTION, SOLUTION SUBARACHNOID at 14:22

## 2022-01-29 RX ADMIN — SODIUM CHLORIDE, SODIUM GLUCONATE, SODIUM ACETATE, POTASSIUM CHLORIDE AND MAGNESIUM CHLORIDE: 526; 502; 368; 37; 30 INJECTION, SOLUTION INTRAVENOUS at 14:11

## 2022-01-29 RX ADMIN — DEXAMETHASONE SODIUM PHOSPHATE 8 MG: 4 INJECTION, SOLUTION INTRA-ARTICULAR; INTRALESIONAL; INTRAMUSCULAR; INTRAVENOUS; SOFT TISSUE at 14:26

## 2022-01-29 RX ADMIN — FAMOTIDINE 20 MG: 10 INJECTION INTRAVENOUS at 13:42

## 2022-01-29 RX ADMIN — OXYTOCIN 1000 ML: 10 INJECTION, SOLUTION INTRAMUSCULAR; INTRAVENOUS at 14:50

## 2022-01-29 RX ADMIN — METOCLOPRAMIDE 10 MG: 5 INJECTION, SOLUTION INTRAMUSCULAR; INTRAVENOUS at 13:41

## 2022-01-29 RX ADMIN — OXYTOCIN 125 ML/HR: 10 INJECTION, SOLUTION INTRAMUSCULAR; INTRAVENOUS at 16:08

## 2022-01-29 RX ADMIN — PHENYLEPHRINE HYDROCHLORIDE 50 MCG/MIN: 10 INJECTION INTRAVENOUS at 14:22

## 2022-01-29 RX ADMIN — MORPHINE SULFATE 150 MCG: 0.5 INJECTION, SOLUTION EPIDURAL; INTRATHECAL; INTRAVENOUS at 14:22

## 2022-01-29 RX ADMIN — FENTANYL CITRATE 100 MCG: 50 INJECTION, SOLUTION INTRAMUSCULAR; INTRAVENOUS at 15:10

## 2022-01-29 RX ADMIN — SODIUM CHLORIDE, SODIUM GLUCONATE, SODIUM ACETATE, POTASSIUM CHLORIDE AND MAGNESIUM CHLORIDE 1000 ML: 526; 502; 368; 37; 30 INJECTION, SOLUTION INTRAVENOUS at 13:41

## 2022-01-29 RX ADMIN — SODIUM CITRATE AND CITRIC ACID MONOHYDRATE 30 ML: 500; 334 SOLUTION ORAL at 13:42

## 2022-01-29 RX ADMIN — ONDANSETRON 4 MG: 2 INJECTION INTRAMUSCULAR; INTRAVENOUS at 14:13

## 2022-01-29 ASSESSMENT — PAIN DESCRIPTION - PAIN TYPE
TYPE: ACUTE PAIN;SURGICAL PAIN

## 2022-01-29 ASSESSMENT — PATIENT HEALTH QUESTIONNAIRE - PHQ9
SUM OF ALL RESPONSES TO PHQ9 QUESTIONS 1 AND 2: 0
1. LITTLE INTEREST OR PLEASURE IN DOING THINGS: NOT AT ALL
2. FEELING DOWN, DEPRESSED, IRRITABLE, OR HOPELESS: NOT AT ALL

## 2022-01-29 ASSESSMENT — FIBROSIS 4 INDEX: FIB4 SCORE: 0.66

## 2022-01-29 ASSESSMENT — PAIN SCALES - GENERAL: PAINLEVEL: 0 - NO PAIN

## 2022-01-29 ASSESSMENT — LIFESTYLE VARIABLES: EVER_SMOKED: NEVER

## 2022-01-29 NOTE — ANESTHESIA PREPROCEDURE EVALUATION
Case: [id number] Date/Time: 22 1315    Procedure:  SECTION, REPEAT, WITH SALPINGECTOMY (Bilateral )    Pre-op diagnosis:       PREVIOUS  SECTION, PERMANENTN STERILIZATION       39 WEEKS    Location: [facility] OR 01 / SURGERY LABOR AND DELIVERY    Surgeons: Meredith Thakkar M.D.          Relevant Problems   OB   (positive) History of C/S x 1 for NRFHT - desires R C/S with BTL       Physical Exam    Airway   Mallampati: II  TM distance: >3 FB  Neck ROM: full       Cardiovascular - normal exam  Rhythm: regular  Rate: normal  (-) murmur     Dental - normal exam           Pulmonary - normal exam  Breath sounds clear to auscultation     Abdominal    Neurological - normal exam                 Anesthesia Plan    ASA 2       Plan - spinal   Neuraxial block will be primary anesthetic                Postoperative Plan: Postoperative administration of opioids is intended.    Pertinent diagnostic labs and testing reviewed    Informed Consent:    Anesthetic plan and risks discussed with patient.

## 2022-01-29 NOTE — ANESTHESIA PROCEDURE NOTES
Spinal Block    Date/Time: 1/29/2022 2:22 PM  Performed by: Cesar Gordon M.D.  Authorized by: Cesar Gordon M.D.     Patient Location:  OR  Start Time:  1/29/2022 2:22 PM  Reason for Block: primary anesthetic    patient identified, IV checked, site marked, risks and benefits discussed, surgical consent, monitors and equipment checked, pre-op evaluation and timeout performed    Patient Position:  Sitting  Prep: ChloraPrep, patient draped and sterile technique    Monitoring:  Blood pressure, continuous pulse oximetry and heart rate  Approach:  Midline  Location:  L3-4  Injection Technique:  Single-shot  Skin infiltration:  Lidocaine  Strength:  1%  Dose:  3ml  Needle Type:  Pencan  Needle Gauge:  25 G  CSF flowing pre/post injection:  Yes  Sensory Level:  T4   Placed with one attempt

## 2022-01-29 NOTE — H&P
OB H&P:    CC: scheduled repeat  section    HPI:  Ms. Molly Jimenez is a 36 y.o.  @ 39w0d by LMP consistent with 11 week ultrasound here for scheduled repeat  section. She does not desire tubal ligation. Pregnancy complicated by AMA but she denies any other complications. Denies past medical history. History of prior  x1 for fetal intolerance.      Contractions: No   Loss of fluid: No   Vaginal bleeding: No   Fetal movement: present      [facility] with [facility]    PNL:  Rh+, RI, HIV neg, TrepAb neg, HBsAg NR, GC/CT neg/neg  Glucola: 103  GBS -      ROS:  Const: denies fevers, general concerns  CV/resp: reports no concerns  GI: denies abd pain, GI concerns  : see HPI  Neuro: denies HA/vision changes    OB History    Para Term  AB Living   3 2 2     2   SAB IAB Ectopic Molar Multiple Live Births           0 2      # Outcome Date GA Lbr Hira/2nd Weight Sex Delivery Anes PTL Lv   3 Current            2 Term 18 41w0d  2.805 kg (6 lb 2.9 oz) F CS-LTranv Spinal N HOLLIS      Complications: Fetal Intolerance   1 Term 04 40w0d  2.722 kg (6 lb) M Vag-Spont  N HOLLIS      Birth Comments: No complications       GYN: denies STIs, no cervical procedures    No past medical history on file.    Past Surgical History:   Procedure Laterality Date   • PRIMARY C SECTION N/A 2018    Procedure: PRIMARY C SECTION;  Surgeon: Bisi Dorantes M.D.;  Location: LABOR AND DELIVERY;  Service: Obstetrics       No current facility-administered medications on file prior to encounter.     Current Outpatient Medications on File Prior to Encounter   Medication Sig Dispense Refill   • Prenatal Multivit-Min-Fe-FA (PRENATAL VITAMINS PO) Take  by mouth.         Family History   Problem Relation Age of Onset   • No Known Problems Mother    • Cancer Sister    • No Known Problems Brother    • Cancer Maternal Grandmother        Social History     Tobacco Use   • Smoking  status: Former Smoker     Packs/day: 0.10     Types: Cigarettes     Quit date: 2018     Years since quitting: 3.8   • Smokeless tobacco: Never Used   Vaping Use   • Vaping Use: Never used   Substance Use Topics   • Alcohol use: Not Currently   • Drug use: No         PE:  Vitals:    22 1226   Temp: 36.2 °C (97.2 °F)   TempSrc: Temporal   Weight: 75.8 kg (167 lb)   Height: 1.524 m (5')     gen: AAO, NAD  abd: soft, gravid, NT  Ext: NT, no edema    FHT: 130/moderate variability/+ accels/ - decels  Faisal: no regular contractions    A/P: 36 y.o.  @ 39w0d by LMP consistent with 11 week ultrasound here for scheduled repeat  section. She does not desire tubal ligation. Pregnancy complicated by AMA but denies any other known complications. History of prior  x1 for fetal intolerance. Prenatal labs normal.   -admit to labor and delivery  -continuous fetal monitoring      Whitley Monae M.D.

## 2022-01-29 NOTE — OR SURGEON
Immediate Post OP Note    PreOp Diagnosis: IUP @ 39+0/7 weeks with hx of previous C/S, desires repeat.       PostOp Diagnosis: Same      Procedure(s):   SECTION, REPEAT, WITH SALPINGECTOMY - Wound Class: Clean Contaminated    Surgeon(s):  RICK Stewart M.D.    Anesthesiologist/Type of Anesthesia:  Anesthesiologist: Cesar Gordon M.D./Spinal    Surgical Staff:  Circulator: Kelsea Lees R.N.  Scrub Person: Tenisha Babcock  L&D Baby  Nurse: Hoda Yi R.N.    Specimens removed if any:  * No specimens in log *    Estimated Blood Loss: 300 cc    Findings: viable female infant in cephalic presentation, clear fluid, apgars 8 and 8, weight pending.  Normal uterus, tubes, ovaries.      Complications: None        2022 3:36 PM Meredith Thakkar M.D.

## 2022-01-30 LAB
ERYTHROCYTE [DISTWIDTH] IN BLOOD BY AUTOMATED COUNT: 45 FL (ref 35.9–50)
HCT VFR BLD AUTO: 37.3 % (ref 37–47)
HGB BLD-MCNC: 12.4 G/DL (ref 12–16)
MCH RBC QN AUTO: 29.6 PG (ref 27–33)
MCHC RBC AUTO-ENTMCNC: 33.2 G/DL (ref 33.6–35)
MCV RBC AUTO: 89 FL (ref 81.4–97.8)
PLATELET # BLD AUTO: 244 K/UL (ref 164–446)
PMV BLD AUTO: 10.8 FL (ref 9–12.9)
RBC # BLD AUTO: 4.19 M/UL (ref 4.2–5.4)
WBC # BLD AUTO: 21.4 K/UL (ref 4.8–10.8)

## 2022-01-30 PROCEDURE — 700102 HCHG RX REV CODE 250 W/ 637 OVERRIDE(OP): Performed by: OBSTETRICS & GYNECOLOGY

## 2022-01-30 PROCEDURE — A9270 NON-COVERED ITEM OR SERVICE: HCPCS | Performed by: OBSTETRICS & GYNECOLOGY

## 2022-01-30 PROCEDURE — 700111 HCHG RX REV CODE 636 W/ 250 OVERRIDE (IP): Performed by: ANESTHESIOLOGY

## 2022-01-30 PROCEDURE — 770002 HCHG ROOM/CARE - OB PRIVATE (112)

## 2022-01-30 PROCEDURE — 700102 HCHG RX REV CODE 250 W/ 637 OVERRIDE(OP): Performed by: ANESTHESIOLOGY

## 2022-01-30 PROCEDURE — A9270 NON-COVERED ITEM OR SERVICE: HCPCS | Performed by: ANESTHESIOLOGY

## 2022-01-30 PROCEDURE — 700102 HCHG RX REV CODE 250 W/ 637 OVERRIDE(OP): Performed by: STUDENT IN AN ORGANIZED HEALTH CARE EDUCATION/TRAINING PROGRAM

## 2022-01-30 PROCEDURE — 700105 HCHG RX REV CODE 258: Performed by: STUDENT IN AN ORGANIZED HEALTH CARE EDUCATION/TRAINING PROGRAM

## 2022-01-30 PROCEDURE — A9270 NON-COVERED ITEM OR SERVICE: HCPCS | Performed by: STUDENT IN AN ORGANIZED HEALTH CARE EDUCATION/TRAINING PROGRAM

## 2022-01-30 RX ORDER — ONDANSETRON 2 MG/ML
4 INJECTION INTRAMUSCULAR; INTRAVENOUS EVERY 6 HOURS PRN
Status: DISCONTINUED | OUTPATIENT
Start: 2022-01-30 | End: 2022-01-31 | Stop reason: HOSPADM

## 2022-01-30 RX ORDER — OXYCODONE HYDROCHLORIDE 5 MG/1
5 TABLET ORAL EVERY 4 HOURS PRN
Status: DISCONTINUED | OUTPATIENT
Start: 2022-01-30 | End: 2022-01-31 | Stop reason: HOSPADM

## 2022-01-30 RX ORDER — IBUPROFEN 800 MG/1
800 TABLET ORAL EVERY 8 HOURS PRN
Status: DISCONTINUED | OUTPATIENT
Start: 2022-02-02 | End: 2022-01-31 | Stop reason: HOSPADM

## 2022-01-30 RX ORDER — IBUPROFEN 800 MG/1
800 TABLET ORAL EVERY 8 HOURS
Status: DISCONTINUED | OUTPATIENT
Start: 2022-01-30 | End: 2022-01-30

## 2022-01-30 RX ORDER — ACETAMINOPHEN 500 MG
1000 TABLET ORAL EVERY 6 HOURS PRN
Status: DISCONTINUED | OUTPATIENT
Start: 2022-02-02 | End: 2022-01-31 | Stop reason: HOSPADM

## 2022-01-30 RX ORDER — ONDANSETRON 4 MG/1
4 TABLET, ORALLY DISINTEGRATING ORAL EVERY 6 HOURS PRN
Status: DISCONTINUED | OUTPATIENT
Start: 2022-01-30 | End: 2022-01-31 | Stop reason: HOSPADM

## 2022-01-30 RX ORDER — ACETAMINOPHEN 500 MG
1000 TABLET ORAL EVERY 6 HOURS
Status: DISCONTINUED | OUTPATIENT
Start: 2022-01-30 | End: 2022-01-31 | Stop reason: HOSPADM

## 2022-01-30 RX ORDER — OXYCODONE HYDROCHLORIDE 10 MG/1
10 TABLET ORAL EVERY 4 HOURS PRN
Status: DISCONTINUED | OUTPATIENT
Start: 2022-01-30 | End: 2022-01-31 | Stop reason: HOSPADM

## 2022-01-30 RX ORDER — IBUPROFEN 800 MG/1
800 TABLET ORAL EVERY 8 HOURS
Status: DISCONTINUED | OUTPATIENT
Start: 2022-01-30 | End: 2022-01-31 | Stop reason: HOSPADM

## 2022-01-30 RX ORDER — DIPHENHYDRAMINE HYDROCHLORIDE 50 MG/ML
25 INJECTION INTRAMUSCULAR; INTRAVENOUS EVERY 6 HOURS PRN
Status: DISCONTINUED | OUTPATIENT
Start: 2022-01-30 | End: 2022-01-31 | Stop reason: HOSPADM

## 2022-01-30 RX ORDER — IBUPROFEN 800 MG/1
800 TABLET ORAL EVERY 8 HOURS PRN
Status: DISCONTINUED | OUTPATIENT
Start: 2022-02-02 | End: 2022-01-30

## 2022-01-30 RX ORDER — DIPHENHYDRAMINE HCL 25 MG
25 TABLET ORAL EVERY 6 HOURS PRN
Status: DISCONTINUED | OUTPATIENT
Start: 2022-01-30 | End: 2022-01-31 | Stop reason: HOSPADM

## 2022-01-30 RX ADMIN — KETOROLAC TROMETHAMINE 30 MG: 30 INJECTION, SOLUTION INTRAMUSCULAR at 06:04

## 2022-01-30 RX ADMIN — IBUPROFEN 800 MG: 800 TABLET, FILM COATED ORAL at 13:06

## 2022-01-30 RX ADMIN — ACETAMINOPHEN 1000 MG: 500 TABLET ORAL at 07:15

## 2022-01-30 RX ADMIN — ACETAMINOPHEN 1000 MG: 500 TABLET ORAL at 13:06

## 2022-01-30 RX ADMIN — ACETAMINOPHEN 1000 MG: 500 TABLET ORAL at 19:00

## 2022-01-30 RX ADMIN — DOCUSATE SODIUM 100 MG: 100 CAPSULE ORAL at 21:56

## 2022-01-30 RX ADMIN — OXYCODONE HYDROCHLORIDE 10 MG: 10 TABLET ORAL at 21:56

## 2022-01-30 RX ADMIN — IBUPROFEN 800 MG: 800 TABLET, FILM COATED ORAL at 21:57

## 2022-01-30 RX ADMIN — ACETAMINOPHEN 1000 MG: 500 TABLET ORAL at 00:43

## 2022-01-30 RX ADMIN — SODIUM CHLORIDE, POTASSIUM CHLORIDE, SODIUM LACTATE AND CALCIUM CHLORIDE: 600; 310; 30; 20 INJECTION, SOLUTION INTRAVENOUS at 01:20

## 2022-01-30 ASSESSMENT — PAIN DESCRIPTION - PAIN TYPE
TYPE: ACUTE PAIN;SURGICAL PAIN
TYPE: SURGICAL PAIN
TYPE: ACUTE PAIN;SURGICAL PAIN

## 2022-01-30 ASSESSMENT — EDINBURGH POSTNATAL DEPRESSION SCALE (EPDS)
I HAVE BEEN ABLE TO LAUGH AND SEE THE FUNNY SIDE OF THINGS: AS MUCH AS I ALWAYS COULD
I HAVE FELT SAD OR MISERABLE: NO, NOT AT ALL
I HAVE FELT SCARED OR PANICKY FOR NO GOOD REASON: NO, NOT AT ALL
THINGS HAVE BEEN GETTING ON TOP OF ME: NO, MOST OF THE TIME I HAVE COPED QUITE WELL
I HAVE BEEN SO UNHAPPY THAT I HAVE HAD DIFFICULTY SLEEPING: NOT AT ALL
I HAVE BEEN SO UNHAPPY THAT I HAVE BEEN CRYING: NO, NEVER
THE THOUGHT OF HARMING MYSELF HAS OCCURRED TO ME: NEVER
I HAVE LOOKED FORWARD WITH ENJOYMENT TO THINGS: AS MUCH AS I EVER DID
I HAVE BLAMED MYSELF UNNECESSARILY WHEN THINGS WENT WRONG: NO, NEVER
I HAVE BEEN ANXIOUS OR WORRIED FOR NO GOOD REASON: YES, SOMETIMES

## 2022-01-30 ASSESSMENT — PAIN SCALES - GENERAL: PAIN_LEVEL: 3

## 2022-01-30 NOTE — LACTATION NOTE
This note was copied from a baby's chart.  Mom is a P3 who delivered baby girl weighing 5 # 10.3 oz at 39.1 wks. Mom has given a few bottles to baby since birth. The last one being at 1030 this morning when she gave 10 mls. LC reviewed supplemental feeding guidelines with mother for when she choses to give supplements after breast feeding. Mom says she is breast feeding first before any formula. Mom just fed baby at 1400 on both breast and did not feed baby needed a supplement after. Baby is with mom and asleep. FOB at bedside   Reviewed demand feeds and discussed calling for bedside RN help if baby not breastfeeding.   Mom is not interested in WIC when offered. She has no pump at home so  will ordered manual pump for her use if needed.  Follow up in the morning with lactation before discharge

## 2022-01-30 NOTE — CARE PLAN
The patient is Stable - Low risk of patient condition declining or worsening    Shift Goals  Clinical Goals: remain clinically stable  Patient Goals: healthymom and baby  Family Goals: support mom and baby    Progress made toward(s) clinical / shift goals:  Patient will ask for pain medication when needed.  Patient has scant to light lochia with a firm palpable uterus.  Vital signs are within defined limits.  Assessment will continue.     Patient is not progressing towards the following goals: na

## 2022-01-30 NOTE — ANESTHESIA POSTPROCEDURE EVALUATION
Patient: Molly Jimenez    Procedure Summary     Date: 22 Room / Location: [facility] OR 01 / SURGERY LABOR AND DELIVERY    Anesthesia Start: 1411 Anesthesia Stop: 1543    Procedure:  SECTION, REPEAT, WITH SALPINGECTOMY (Bilateral Abdomen) Diagnosis:        delivery delivered      ( delivery, delivered)    Surgeons: Meredith Thakkar M.D. Responsible Provider: Cesar Gordon M.D.    Anesthesia Type: spinal ASA Status: 2          Final Anesthesia Type: spinal  Last vitals  BP   Blood Pressure: 121/80    Temp   (!) 35.4 °C (95.8 °F)    Pulse   71   Resp   18    SpO2   96 %      Anesthesia Post Evaluation    Patient location during evaluation: PACU  Patient participation: complete - patient participated  Level of consciousness: awake and alert  Pain score: 3    Airway patency: patent  Anesthetic complications: no  Cardiovascular status: hemodynamically stable  Respiratory status: acceptable  Hydration status: euvolemic    PONV: none          No complications documented.     Nurse Pain Score: 3 (NPRS)

## 2022-01-30 NOTE — CARE PLAN
The patient is Stable - Low risk of patient condition declining or worsening    Shift Goals  Clinical Goals: healthy mom and baby  Patient Goals: healthymom and baby  Family Goals: support mom and baby    Progress made toward(s) clinical / shift goals:  healthy mom and baby  Problem: Knowledge Deficit - L&D  Goal: Patient and family/caregivers will demonstrate understanding of plan of care, disease process/condition, diagnostic tests and medications  Outcome: Progressing     Problem: Risk for Excess Fluid Volume  Goal: Patient will demonstrate pulse, blood pressure and neurologic signs within expected ranges and without any respiratory complications  Outcome: Progressing     Problem: Knowledge Deficit -   Goal: Patient/support person will demonstrate understanding regarding  section  Outcome: Progressing     Problem: Psychosocial - L&D  Goal: Patient's level of anxiety will decrease  Outcome: Progressing  Goal: Patient will be able to discuss coping skills during hospitalization  Outcome: Progressing  Goal: Patient's ability to re-evaluate and adapt role responsibilities will improve  Outcome: Progressing  Goal: Spiritual and cultural needs incorporated into hospitalization  Outcome: Progressing     Problem: Pain  Goal: Patient's pain will be alleviated or reduced to the patient’s comfort goal  Outcome: Progressing     Problem: Risk for Infection and Impaired Wound Healing  Goal: Patient will remain free from infection  Outcome: Progressing  Goal: Patient's wound/surgical incision will decrease in size and heals properly  Outcome: Progressing     Problem: Risk for Fluid Imbalance  Goal: Patient's fluid volume balance will be maintained or improve  Outcome: Progressing     Problem: Risk for Venous Thromboembolism (VTE)  Goal: VTE prevention measures will be implemented and patient will remain free from VTE  Outcome: Progressing     Problem: Respiratory  Goal: Patient will achieve/maintain optimum  respiratory ventilation and gas exchange  Outcome: Progressing     Problem: Discharge Barriers/Planning  Goal: Patient's continuum of care needs are met  Outcome: Progressing       Patient is not progressing towards the following goals:

## 2022-01-30 NOTE — PROGRESS NOTES
1704: report given to BENJA Ewing. Pt. Vitals WNL. Fundus U/U with light rubra and firm. Pt. Stable.

## 2022-01-30 NOTE — PROGRESS NOTES
Called Midwife, Marilyn Bazzi, to report that pt's output is less than 30 ml an hour. Midwife ordered a 500 ml  lactated ringers bolus and she ordered to keep catheter in until urine output is more than 30 ml in an hour. No new orders given.

## 2022-01-30 NOTE — PROGRESS NOTES
Assessment completed. Patient claims to have good pain relief with prn pain medications. Pt states she will call when she needs prn pain medication. Pt denies any other issues at this time. Educated pt about feeding infant every 2-3 hours or when infant is showing cues. Pt encouraged to call for next feed to assess latch. Pt encouraged to call for any needs.

## 2022-01-30 NOTE — OP REPORT
DATE OF SERVICE:  2022     PREOPERATIVE DIAGNOSES:    1.  Intrauterine pregnancy at 39 and 0/7 weeks.  2.  History of previous  section, desires repeat.     POSTOPERATIVE DIAGNOSES:    1.  Intrauterine pregnancy at 39 and 0/7 weeks.  2.  History of previous  section, desires repeat.     PROCEDURE:  Repeat low transverse  section via Pfannenstiel.     SURGEON:  Meredith Thakkar MD      ASSISTANT:  Whitley Monae MD, PGY1     ANESTHESIA:  Spinal.     COMPLICATIONS:  None.     ESTIMATED BLOOD LOSS:  300 mL     FLUIDS:  2 liters of LR.     URINE OUTPUT:  200 mL clear urine at the end of the procedure.     INDICATIONS FOR PROCEDURE:   This is a 36-year-old  3, para 2-0-0-2, at   39 and 0/7 weeks with history of previous  section and desires   repeat.     FINDINGS:  Viable female infant in cephalic presentation with clear fluid,   Apgars 8 and 8, weight pending.  Normal uterus, tubes and ovaries.     PROCEDURE IN DETAIL:  The patient was taken to the operating room where spinal   anesthesia was found to be adequate.  She was then prepped and draped in the   usual normal sterile fashion in the dorsal supine position with a leftward hip   tilt.  A Pfannenstiel skin incision was made with the scalpel and carried to   the underlying layer of the fascia with the Bovie.  The fascia was incised in   the midline and the incision was extended laterally with the Booker scissors.    The superior aspect of the fascial incision was grasped with Kocher clamps,   elevated and the underlying rectus muscles were dissected off sharply.  The   rectus muscles were noted to be densely adhered to the fascia.  Attention was   then turned to the inferior aspect of this incision which in a similar fashion   was grasped, tented up with Kocher clamps and the rectus muscles dissected   off sharply.  The rectus muscles were  in the midline and the   peritoneum was identified, tented up and entered  sharply with the Metzenbaum   scissors.  The peritoneal incision was then extended superiorly and inferiorly   with good visualization of the bladder.  The bladder blade was then inserted   and the vesicouterine peritoneum identified, grasped with pickups and entered   sharply with the Metzenbaum scissors.  This incision was extended laterally   and the bladder flap was created digitally.  The bladder blade was then   reinserted and the lower uterine segment incised in a transverse fashion with   the scalpel.  The uterine incision was extended laterally digitally.  The   bladder blade was removed and the infant's head was delivered atraumatically.    The remainder of the infant was delivered without difficulty.  The mouth and   nares were bulb suctioned on the field and the cord was clamped and cut after   approximately 30 seconds.  The infant was handed to the awaiting attendant.    The placenta was then removed manually and the uterus was exteriorized and   cleared of all clot and debris.  The uterine incision was repaired with 1-0   chromic in a running locked fashion.  Excellent hemostasis was noted.  The   uterus was returned to the abdomen and the gutters were cleared of all clot   and debris.  The uterine incision was reinspected and noted to have some   oozing from the bladder flap, so hemoblast was applied.  Hemostasis was noted.    The peritoneum was closed with 3-0 Vicryl.  The fascia was reapproximated   with 0 Vicryl in a running fashion.  The subcuticular fat was irrigated and   Denys's layer was closed with interrupted sutures of 2-0 Vicryl.  The skin   was closed with staples.  The patient tolerated the procedure well.  Sponge,   lap and needle counts were correct x3.  The patient was taken to the recovery   room in stable condition.        ______________________________  Meredith Thakkar MD AFC/GABY    DD:  01/29/2022 15:44  DT:  01/29/2022 16:23    Job#:  [id number]

## 2022-01-30 NOTE — PROGRESS NOTES
Bedside report received from Yesenia DUNBAR RN. Patient care assumed. Chart, prenatal labs, and orders reviewed  Patient assessment complete and WDL. Fundus firm and lochia light. Patient denies any dizziness/lightheadedness or calf pain/tenderness. Patient also denies any chest pain, difficulty breathing or SOB, respiratory symptoms, or any recent ill contacts. Kindly reminded patient and support person to wear face masks when staff is present in room; also visitors need to wear mask when in room. Plan of care discussed with patient for the day including infant feeding every 2-3 hours or on demand, pain management, and ambulation in halls. Pain medication plan discussed with patient; patient states she will call if PRN pain medication is wanted. All questions/concerns addressed at this time. Call light within reach, encouraged to call with needs. Will continue with routine postpartum cares.

## 2022-01-30 NOTE — ANESTHESIA TIME REPORT
Anesthesia Start and Stop Event Times     Date Time Event    2022 1315 Ready for Procedure     1411 Anesthesia Start     1543 Anesthesia Stop        Responsible Staff  22    Name Role Begin End    Cesar Gordon M.D. Anesth 1411 1543        Preop Diagnosis (Free Text):  Pre-op Diagnosis     PREVIOUS  SECTION, PERMANENTN STERILIZATION   39 WEEKS        Preop Diagnosis (Codes):  Diagnosis Information     Diagnosis Code(s):  delivery delivered [O82]        Premium Reason  E. Weekend    Comments:

## 2022-01-30 NOTE — CARE PLAN
The patient is Stable - Low risk of patient condition declining or worsening    Shift Goals  Clinical Goals: pain management   Patient Goals:   Family Goals:     Progress made toward(s) clinical / shift goals: Pt able to ambulate. Pt states her pain is controlled with prn pain medication. Pt's pain reassessed the this shift.        Problem: Knowledge Deficit - Postpartum  Goal: Patient will verbalize and demonstrate understanding of self and infant care  Outcome: Progressing     Problem: Psychosocial - Postpartum  Goal: Patient will verbalize and demonstrate effective bonding and parenting behavior  Outcome: Progressing     Problem: Altered Physiologic Condition  Goal: Patient physiologically stable as evidenced by normal lochia, palpable uterine involution and vitals within normal limits  Outcome: Progressing     Problem: Infection - Postpartum  Goal: Postpartum patient will be free of signs and symptoms of infection  Outcome: Progressing     Problem: Respiratory/Oxygenation Function Post-Surgical  Goal: Patient will achieve/maintain normal respiratory rate/effort  Outcome: Progressing     Problem: Bowel Elimination - Post Surgical  Goal: Patient will resume regular bowel sounds and function with no discomfort or distention  Outcome: Progressing     Problem: Early Mobilization - Post Surgery  Goal: Early mobilization post surgery  Outcome: Progressing

## 2022-01-30 NOTE — PROGRESS NOTES
Obstetrics & Gynecology Post-Delivery Progress Note    Date of Service      36 y.o.  s/p  for repeat  Delivery date: 22      Subjective  Pt reports pain well controlled with as needed medications. Working on latch and breastfeeding.   Pain: Yes,  controlled  Bleeding: lochia minimal  Tolerating PO: yes  Voiding: jason discontinued, awaiting spontaneous void  Ambulating: yes  Passing flatus: Yes  Feeding: breastfeeding some trouble with latch so supplementing with some formula      Objective  24hr VS:  Temp:  [35.4 °C (95.8 °F)-36.7 °C (98.1 °F)] 35.6 °C (96 °F)  Pulse:  [55-75] 67  Resp:  [16-18] 17  BP: (115-163)/(70-93) 123/72  SpO2:  [89 %-100 %] 95 %    Physical Exam  Gen: well  CV: RRR   Resp: unlabored respirations, no intercostal retractions or accessory muscle use  Abd: LLQ, RLQ tenderness, soft, non-distended  Fundus: firm, at umbilicus and nontender  Incision: dressing clean, dry, intact  Ext: symmetric and no edema, calves nontender    Labs:  Hgb 12.5  Hct 37.3  WBC 21.4  Plt 244    Medications  LR, , Intravenous, Once  oxytocin, 2,000 mL/hr, Intravenous, Once  acetaminophen, 1,000 mg, Oral, Q6HR  ketorolac, 30 mg, Intravenous, Q6HR      oxytocin, oxyCODONE immediate-release, oxyCODONE immediate release, HYDROmorphone, HYDROmorphone, ePHEDrine, ondansetron, diphenhydrAMINE, diphenhydrAMINE **OR** diphenhydrAMINE **OR** naloxone 0.4 mg in 1000 mL infusion, LR, docusate sodium, tetanus-dipth-acell pertussis, measles, mumps and rubella vaccine      Assessment/Plan  Molly Jimenez is a 36 y.o.yo  postop day #1  s/p  for repeat    - Post care: meeting all goals  - Pain: controlled  - Rh+, Rubella Immune  - Method of Feeding: plans to breastfeed  - Method of Contraception: discuss prior to discharge  VTE prophylaxis: SCDs    - Disposition: likely home PPD2-3     Whitley Monae M.D.

## 2022-01-31 VITALS
OXYGEN SATURATION: 96 % | HEART RATE: 74 BPM | RESPIRATION RATE: 18 BRPM | SYSTOLIC BLOOD PRESSURE: 128 MMHG | BODY MASS INDEX: 32.79 KG/M2 | HEIGHT: 60 IN | TEMPERATURE: 97.8 F | WEIGHT: 167 LBS | DIASTOLIC BLOOD PRESSURE: 81 MMHG

## 2022-01-31 PROCEDURE — A9270 NON-COVERED ITEM OR SERVICE: HCPCS | Performed by: NURSE PRACTITIONER

## 2022-01-31 PROCEDURE — 700102 HCHG RX REV CODE 250 W/ 637 OVERRIDE(OP): Performed by: NURSE PRACTITIONER

## 2022-01-31 PROCEDURE — A9270 NON-COVERED ITEM OR SERVICE: HCPCS | Performed by: STUDENT IN AN ORGANIZED HEALTH CARE EDUCATION/TRAINING PROGRAM

## 2022-01-31 PROCEDURE — A9270 NON-COVERED ITEM OR SERVICE: HCPCS | Performed by: OBSTETRICS & GYNECOLOGY

## 2022-01-31 PROCEDURE — 700102 HCHG RX REV CODE 250 W/ 637 OVERRIDE(OP): Performed by: OBSTETRICS & GYNECOLOGY

## 2022-01-31 PROCEDURE — 700102 HCHG RX REV CODE 250 W/ 637 OVERRIDE(OP): Performed by: STUDENT IN AN ORGANIZED HEALTH CARE EDUCATION/TRAINING PROGRAM

## 2022-01-31 RX ORDER — SIMETHICONE 125 MG
125 TABLET,CHEWABLE ORAL 3 TIMES DAILY PRN
Status: DISCONTINUED | OUTPATIENT
Start: 2022-01-31 | End: 2022-01-31 | Stop reason: HOSPADM

## 2022-01-31 RX ORDER — OXYCODONE HYDROCHLORIDE 5 MG/1
5 TABLET ORAL EVERY 4 HOURS PRN
Qty: 20 TABLET | Refills: 0 | Status: SHIPPED | OUTPATIENT
Start: 2022-01-31 | End: 2022-02-07

## 2022-01-31 RX ORDER — ACETAMINOPHEN 500 MG
1000 TABLET ORAL EVERY 6 HOURS
Qty: 30 TABLET | Refills: 0 | Status: SHIPPED | OUTPATIENT
Start: 2022-01-31

## 2022-01-31 RX ORDER — PSEUDOEPHEDRINE HCL 30 MG
100 TABLET ORAL 2 TIMES DAILY PRN
Qty: 60 CAPSULE | Refills: 0 | Status: SHIPPED | OUTPATIENT
Start: 2022-01-31

## 2022-01-31 RX ORDER — IBUPROFEN 800 MG/1
800 TABLET ORAL EVERY 8 HOURS
Qty: 30 TABLET | Refills: 0 | Status: SHIPPED | OUTPATIENT
Start: 2022-01-31

## 2022-01-31 RX ADMIN — ACETAMINOPHEN 1000 MG: 500 TABLET ORAL at 07:59

## 2022-01-31 RX ADMIN — IBUPROFEN 800 MG: 800 TABLET, FILM COATED ORAL at 05:58

## 2022-01-31 RX ADMIN — OXYCODONE HYDROCHLORIDE 10 MG: 10 TABLET ORAL at 11:02

## 2022-01-31 RX ADMIN — SIMETHICONE 125 MG: 125 TABLET, CHEWABLE ORAL at 00:47

## 2022-01-31 RX ADMIN — ACETAMINOPHEN 1000 MG: 500 TABLET ORAL at 00:47

## 2022-01-31 ASSESSMENT — PAIN DESCRIPTION - PAIN TYPE: TYPE: ACUTE PAIN;SURGICAL PAIN

## 2022-01-31 NOTE — CARE PLAN
The patient is Stable - Low risk of patient condition declining or worsening    Shift Goals  Clinical Goals: pain management, light lochia  Patient Goals: healthymom and baby  Family Goals: support mom and baby    Progress made toward(s) clinical / shift goals:  pain is well managed with pain medication and lochia remains light.    Patient is not progressing towards the following goals: N/A

## 2022-01-31 NOTE — DISCHARGE SUMMARY
Discharge Summary:      Molly Jimenez    Admit Date:   2022  Discharge Date:  2022     Admitting diagnosis:  Indication for care in labor or delivery [O75.9]  Discharge Diagnosis: Status post  for repeat.  Pregnancy Complications: AMA  Tubal Ligation:  no        History:  History reviewed. No pertinent past medical history.  OB History    Para Term  AB Living   3 2 2     2   SAB IAB Ectopic Molar Multiple Live Births           0 2      # Outcome Date GA Lbr Hira/2nd Weight Sex Delivery Anes PTL Lv   3 Current            2 Term 18 41w0d  2.805 kg (6 lb 2.9 oz) F CS-LTranv Spinal N HOLLIS      Complications: Fetal Intolerance   1 Term 04 40w0d  2.722 kg (6 lb) M Vag-Spont  N HOLLIS      Birth Comments: No complications        Patient has no known allergies.  Patient Active Problem List    Diagnosis Date Noted   • Postpartum care following  delivery 2022   • Indication for care in labor or delivery 2022   • Abnormal pregnancy US - nasal bones absent - AFP wnl, no f/u needed 2021   • ASCUS of cervix with negative high risk HPV 2021   • History of C/S x 1 for NRFHT - desires R C/S with BTL 2021   • Advanced maternal age in multigravida, second trimester 2021        Hospital Course:   36 y.o. , now para 3, was admitted with the above mentioned diagnosis, underwent Repeat  repeat,  for repeat. Patient postpartum course was unremarkable, with progressive advancement in diet , ambulation and toleration of oral analgesia. Patient without complaints today and desires discharge.      Vitals:    22 1000 22 1400 22 1800 22 0600   BP: 112/71 121/66 131/77 128/81   Pulse: 78 69 73 74   Resp: 20 18 18 18   Temp: 36.5 °C (97.7 °F) 36.6 °C (97.9 °F) 36.9 °C (98.4 °F) 36.6 °C (97.8 °F)   TempSrc: Temporal Temporal Temporal Temporal   SpO2: 94% 97% 97% 96%   Weight:       Height:            Current Facility-Administered Medications   Medication Dose   • simethicone (Mylicon) chewable tablet 125 mg  125 mg   • acetaminophen (TYLENOL) tablet 1,000 mg  1,000 mg    Followed by   • [START ON 2/2/2022] acetaminophen (TYLENOL) tablet 1,000 mg  1,000 mg   • oxyCODONE immediate-release (ROXICODONE) tablet 5 mg  5 mg   • oxyCODONE immediate release (ROXICODONE) tablet 10 mg  10 mg   • ondansetron (ZOFRAN) syringe/vial injection 4 mg  4 mg    Or   • ondansetron (ZOFRAN ODT) dispertab 4 mg  4 mg   • diphenhydrAMINE (BENADRYL) tablet/capsule 25 mg  25 mg    Or   • diphenhydrAMINE (BENADRYL) injection 25 mg  25 mg   • ibuprofen (MOTRIN) tablet 800 mg  800 mg    Followed by   • [START ON 2/2/2022] ibuprofen (MOTRIN) tablet 800 mg  800 mg   • oxytocin (PITOCIN) injection 10 Units  10 Units   • lactated ringers infusion     • docusate sodium (COLACE) capsule 100 mg  100 mg   • tetanus-dipth-acell pertussis (Tdap) inj 0.5 mL  0.5 mL   • measles, mumps and rubella vaccine (MMR) injection 0.5 mL  0.5 mL       Exam:  Breast Exam: negative  Abdomen: Abdomen soft, non-tender. BS normal. No masses,  No organomegaly  Fundus Non Tender: yes  Incision: no evidence of infection, separation or keloid formation.  Perineum: perineum intact  Extremity: extremities, peripheral pulses and reflexes normal     Labs:  Recent Labs     01/29/22  1237 01/29/22  2325   WBC 10.5 21.4*   RBC 4.59 4.19*   HEMOGLOBIN 13.7 12.4   HEMATOCRIT 40.2 37.3   MCV 87.6 89.0   MCH 29.8 29.6   MCHC 34.1 33.2*   RDW 44.5 45.0   PLATELETCT 250 244   MPV 10.5 10.8        Activity:   Discharge to home  Pelvic Rest x 6 weeks    Assessment:  normal postpartum course  Discharge Assessment: No areas of skin breakdown/redness; surgical incision intact/healing     [facility]: [facility] or [facility] in 5 weeks for vaginal ; 1 week for incision check.      Discharge Meds:   Current Outpatient Medications   Medication Sig Dispense Refill   • docusate sodium  100 MG Cap Take 100 mg by mouth 2 times a day as needed for Constipation. 60 Capsule 0   • ibuprofen (MOTRIN) 800 MG Tab Take 1 Tablet by mouth every 8 hours. Indications: Joint Damage causing Pain and Loss of Function 30 Tablet 0   • oxyCODONE immediate-release (ROXICODONE) 5 MG Tab Take 1 Tablet by mouth every four hours as needed for up to 7 days. 20 Tablet 0   • acetaminophen (TYLENOL) 500 MG Tab Take 2 Tablets by mouth every 6 hours. 30 Tablet 0       Nubia Zabala, P.A.

## 2022-01-31 NOTE — PROGRESS NOTES
D/c instructions given to pt regarding medications and follow up appt. Educated on d/c instructions for mom and baby. Questions answered.

## 2022-01-31 NOTE — CARE PLAN
The patient is Stable - Low risk of patient condition declining or worsening    Shift Goals  Clinical Goals: pain control, rest  Patient Goals: healthymom and baby  Family Goals: support mom and baby    Progress made toward(s) clinical / shift goals:  Pain controlled with PRN medications. Ambulating well.     Patient is not progressing towards the following goals:

## 2022-01-31 NOTE — LACTATION NOTE
"Met with MOB for a lactation follow up visit.  MOB reported she is providing infant with both breast milk and formula.  She denied having any lactation questions and/or concerns at this time.  MOB denied pain with latch, but stated she did have the \"normal\" tissue damage to her breasts with latch.  MOB informed latch should not cause tissue damage to breasts and lactation assistance was offered twice, but MOB declined.    MOB provided with lanolin cream along with instructions on use.    MOB stated she was provided with a written hand out on the breastfeeding resources available to her on an outpatient basis by previous LC, Melissa Barnett.    MOB was encouraged to call for lactation support as needed prior to discharge.    11:25- Manual breast pump ordered by previous LC, Melissa Barnett, and breast pump provided to MOB by this LC.  This LC offered to provide MOB with instruction on pump assembly, use, and cleaning of pump parts, but MOB declined.  She stated, \"I have used one just like it before and remember how to use it.\"  "

## 2022-01-31 NOTE — DISCHARGE INSTRUCTIONS
PATIENT DISCHARGE EDUCATION INSTRUCTION SHEET  REASONS TO CALL YOUR OBSTETRICIAN  · Persistent fever, shaking, chills (Temperature higher than 100.4) may indicate you have an infection  · Heavy bleeding: soaking more than 1 pad per hour; Passing clots an egg-sized clot or bigger may mean you have an postpartum hemorrhage  · Foul odor from vagina or bad smelling or discolored discharge or blood  · Breast infection (Mastitis symptoms); breast pain, chills, fever, redness or red streaks, may feel flu like symptoms  · Urinary pain, burning or frequency  · Incision that is not healing, increased redness, swelling, tenderness or pain, or any pus from episiotomy or  site may mean you have an infection  · Redness, swelling, warmth, or painful to touch in the calf area of your leg may mean you have a blood clot  · Severe or intensified depression, thoughts or feelings of wanting to hurt yourself or someone else   · Pain in chest, obstructed breathing or shortness of breath (trouble catching your breath) may mean you are having a postpartum complication. Call your provider immediately   · Headache that does not get better, even after taking medicine, a bad headache with vision changes or pain in the upper right area of your belly may mean you have high blood pressure or post birth preeclampsia. Call your provider immediately    HAND WASHING  All family and friends should wash their hands:  · Before and after holding the baby  · Before feeding the baby  · After using the restroom or changing the baby's diaper    WOUND CARE  Ask your physician for additional care instructions. In general:  ·  Incision:  · May shower and pat incision dry   · Keep the incision clean and dry  · There should not be any opening or pus from the incision  · Continue to walk at home 3 times a day   · Do NOT lift anything heavier than your baby (over 10 pounds)  · Encourage family to help participate in care of the  to allow  rest and mom time to heal  · Episiotomy/Laceration  · May use lore-spray bottle, witch hazel pads and dermaplast spray for comfort  · Use lore-spray bottle after urinating to cleanse perineal area  · To prevent burning during urination spray lore-water bottle on labial area   · Pat perineal area dry until episiotomy/laceration is healed  · Continue to use lore-bottle until bleeding stops as needed  · If have a 2nd degree laceration or greater, a Sitz bath can offer relief from soreness, burning, and inflammation   · Sitz Bath   · Sit in 6 inches of warm water and soak laceration as needed until the laceration heals    VAGINAL CARE AND BLEEDING  · Nothing inside vagina for 6 weeks:   · No sexual intercourse, tampons or douching  · Bleeding may continue for 2-4 weeks. Amount and color may vary  · Soaking 1 pad or more in an hour for several hours is considered heavy bleeding  · Passing large egg sized blood clots can be concerning  · If you feel like you have heavy bleeding or are having increasing amount of blood clots call your Obstetrician immediately  · If you begin feeling faint upon standing, feeling sick to your stomach, have clammy skin, a really fast heartbeat, have chills, start feeling confused, dizzy, sleepy or weak, or feeling like you're going to faint call your Obstetrician immediately    HYPERTENSION   Preeclampsia or gestational hypertension are types of high blood pressure that only pregnant women can get. It is important for you to be aware of symptoms to seek early intervention and treatment. If you have any of these symptoms immediately call your Obstetrician    · Vision changes or blurred vision   · Severe headache or pain that is unrelieved with medication and will not go away  · Persistent pain in upper abdomen or shoulder   · Increased swelling of face, feet, or hands  · Difficulty breathing or shortness of breath at rest  · Urinating less than usual    URINATION AND BOWEL MOVEMENTS  · Eating  "more fiber (bran cereal, fruits, and vegetables) and drinking plenty of fluids will help to avoid constipation  · Urinary frequency and urgency after childbirth is normal  · If you experience any urinary pain, burning or frequency call your provider    BIRTH CONTROL  · It is possible to become pregnant at any time after delivery and while breastfeeding  · Plan to discuss a method of birth control with your physician at your post delivery follow up visit    POSTPARTUM BLUES  During the first few days after birth, you may experience a sense of the \"blues\" which may include impatience, irritability or even crying. These feelings come and go quickly. However, as many as 1 in 10 women experience emotional symptoms known as postpartum depression.     POSTPARTUM DEPRESSION    May start as early as the second or third day after delivery or take several weeks or months to develop. Symptoms of \"blues\" are present, but are more intense: Crying spells; loss of appetite; feelings of hopelessness or loss of control; fear of touching the baby; over concern or no concern at all about the baby; little or no concern about your own appearance/caring for yourself; and/or inability to sleep or excessive sleeping. Contact your Obstetrician if you are experiencing any of these symptoms     PREVENTING SHAKEN BABY  If you are angry or stressed, PUT THE BABY IN THE CRIB, step away, take some deep breaths, and wait until you are calm to care for the baby. DO NOT SHAKE THE BABY. You are not alone, call a supporter for help.  · [facility] 24/7 crisis call line (120-994-5501) or (1-471.276.1040)  · You can also text them, text \"ANSWER\" (974641)      "

## 2022-02-02 ENCOUNTER — OFFICE VISIT (OUTPATIENT)
Dept: OBGYN | Facility: CLINIC | Age: 37
End: 2022-02-02
Payer: COMMERCIAL

## 2022-02-02 VITALS — SYSTOLIC BLOOD PRESSURE: 118 MMHG | DIASTOLIC BLOOD PRESSURE: 78 MMHG | BODY MASS INDEX: 32.61 KG/M2 | HEIGHT: 60 IN

## 2022-02-02 DIAGNOSIS — T81.49XA WOUND INFECTION AFTER SURGERY: ICD-10-CM

## 2022-02-02 DIAGNOSIS — T81.30XA WOUND DEHISCENCE: ICD-10-CM

## 2022-02-02 PROCEDURE — 99024 POSTOP FOLLOW-UP VISIT: CPT | Performed by: OBSTETRICS & GYNECOLOGY

## 2022-02-02 RX ORDER — IBUPROFEN 800 MG/1
800 TABLET ORAL EVERY 8 HOURS PRN
Qty: 30 TABLET | Refills: 1 | Status: SHIPPED | OUTPATIENT
Start: 2022-02-02

## 2022-02-02 RX ORDER — CEPHALEXIN 250 MG/1
250 CAPSULE ORAL 4 TIMES DAILY
Qty: 20 CAPSULE | Refills: 0 | Status: SHIPPED | OUTPATIENT
Start: 2022-02-02

## 2022-02-02 RX ORDER — DOCUSATE SODIUM 100 MG/1
100 CAPSULE, LIQUID FILLED ORAL 2 TIMES DAILY
Qty: 60 CAPSULE | Refills: 1 | Status: SHIPPED | OUTPATIENT
Start: 2022-02-02

## 2022-02-02 NOTE — PROGRESS NOTES
Called to room by MA for staple removal. Area of wound not healing together approximately 4 cm across on the right side of the incision. Antibiotic ointment applied and steristrips applied to bring the tissue together. Will rx keflex for potential infection. FU in one week to have steri-strips removed and to assess status. Np purulent drainage noted. Only superficial dehiscence.

## 2022-02-10 ENCOUNTER — POST PARTUM (OUTPATIENT)
Dept: OBGYN | Facility: CLINIC | Age: 37
End: 2022-02-10
Payer: COMMERCIAL

## 2022-02-10 VITALS — DIASTOLIC BLOOD PRESSURE: 80 MMHG | SYSTOLIC BLOOD PRESSURE: 126 MMHG | BODY MASS INDEX: 30.86 KG/M2 | WEIGHT: 158 LBS

## 2022-02-10 PROCEDURE — 99024 POSTOP FOLLOW-UP VISIT: CPT | Performed by: OBSTETRICS & GYNECOLOGY

## 2022-02-10 ASSESSMENT — FIBROSIS 4 INDEX: FIB4 SCORE: 0.75

## 2022-02-10 NOTE — PROGRESS NOTES
Chief complaint: Return OB visit    SUBJECTIVE:  Molly Jimenez presents to the clinic 2 weeks following repeat low transverse .  Seen last week and was noticed to have a slight separation of the incision with some erythema.  Start antibiotics and Steri-Strips applied.  Presents today for follow-up.  Denies any incisional pain or drainage.    Eating a regular diet without difficulty.   Bowel movement are Normal.    The patient is not having any pain.   .   Patient Denies Incisional pain, drainage or redness    OBJECTIVE:  /80   Wt 71.7 kg (158 lb)   LMP 2021   BMI 30.86 kg/m²   Current Outpatient Medications on File Prior to Visit   Medication Sig Dispense Refill   • cephALEXin (KEFLEX) 250 MG Cap Take 1 Capsule by mouth 4 times a day. 20 Capsule 0   • ibuprofen (MOTRIN) 800 MG Tab Take 1 Tablet by mouth every 8 hours as needed. 30 Tablet 1   • docusate sodium (COLACE) 100 MG Cap Take 1 Capsule by mouth 2 times a day. 60 Capsule 1   • docusate sodium 100 MG Cap Take 100 mg by mouth 2 times a day as needed for Constipation. 60 Capsule 0   • ibuprofen (MOTRIN) 800 MG Tab Take 1 Tablet by mouth every 8 hours. Indications: Joint Damage causing Pain and Loss of Function 30 Tablet 0   • acetaminophen (TYLENOL) 500 MG Tab Take 2 Tablets by mouth every 6 hours. 30 Tablet 0   • Prenatal Multivit-Min-Fe-FA (PRENATAL VITAMINS PO) Take  by mouth.       No current facility-administered medications on file prior to visit.       Constitutional:  alert, healthy, no distress.  Abdomen:  soft, bowel sounds active, non-tender, non-distended.  Incision:  healing well, no drainage, no erythema, no hernia, no seroma, no swelling, healing well, no dehiscence, incision well approximated.    IMPRESSION: Doing well postoperatively.  Pt is to increase activities as tolerated.    Lab:   Recent Results (from the past 1008 hour(s))   PROTEIN/CREAT RATIO URINE    Collection Time: 22  3:06 PM    Result Value Ref Range    Total Protein, Urine 6.0 0.0 - 15.0 mg/dL    Creatinine, Random Urine 50.81 mg/dL    Protein Creatinine Ratio 118 (H) 10 - 107 mg/g   PREGNANCY INDUCED HYPERTENSION PROFILE-CBC W/O, BUN, CREATININE, AST, URIC ACID    Collection Time: 01/05/22  3:06 PM   Result Value Ref Range    WBC 9.7 4.8 - 10.8 K/uL    RBC 4.41 4.20 - 5.40 M/uL    Hemoglobin 12.9 12.0 - 16.0 g/dL    Hematocrit 39.7 37.0 - 47.0 %    MCV 90.0 81.4 - 97.8 fL    MCH 29.3 27.0 - 33.0 pg    MCHC 32.5 (L) 33.6 - 35.0 g/dL    RDW 45.9 35.9 - 50.0 fL    Platelet Count 265 164 - 446 K/uL    MPV 11.0 9.0 - 12.9 fL    Bun 5 (L) 8 - 22 mg/dL    Creatinine 0.33 (L) 0.50 - 1.40 mg/dL    AST(SGOT) 11 (L) 12 - 45 U/L    Uric Acid 4.0 1.9 - 8.2 mg/dL   ESTIMATED GFR    Collection Time: 01/05/22  3:06 PM   Result Value Ref Range    GFR If African American >60 >60 mL/min/1.73 m 2    GFR If Non African American >60 >60 mL/min/1.73 m 2   POCT Fetal Nonstress Test    Collection Time: 01/13/22  1:49 PM   Result Value Ref Range    NST Indications      NST Baseline      NST Uterine Activity      NST Acoustic Stimulation      NST Assessment      NST Action Necessary      NST Other Data      NST Return      NST Read By     GRP B STREP, BY PCR (RAYGOZA BROTH)    Collection Time: 01/13/22  2:31 PM    Specimen: Genital   Result Value Ref Range    Strep Gp B DNA PCR Negative Negative   PROTEIN/CREAT RATIO URINE    Collection Time: 01/20/22  8:09 PM   Result Value Ref Range    Total Protein, Urine 8.0 0.0 - 15.0 mg/dL    Creatinine, Random Urine 46.71 mg/dL    Protein Creatinine Ratio 171 (H) 10 - 107 mg/g   CBC WITH DIFFERENTIAL    Collection Time: 01/20/22  8:17 PM   Result Value Ref Range    WBC 9.8 4.8 - 10.8 K/uL    RBC 4.43 4.20 - 5.40 M/uL    Hemoglobin 13.3 12.0 - 16.0 g/dL    Hematocrit 38.7 37.0 - 47.0 %    MCV 87.4 81.4 - 97.8 fL    MCH 30.0 27.0 - 33.0 pg    MCHC 34.4 33.6 - 35.0 g/dL    RDW 43.5 35.9 - 50.0 fL    Platelet Count 232 164 - 446  K/uL    MPV 10.2 9.0 - 12.9 fL    Neutrophils-Polys 69.00 44.00 - 72.00 %    Lymphocytes 23.10 22.00 - 41.00 %    Monocytes 6.10 0.00 - 13.40 %    Eosinophils 1.10 0.00 - 6.90 %    Basophils 0.30 0.00 - 1.80 %    Immature Granulocytes 0.40 0.00 - 0.90 %    Nucleated RBC 0.00 /100 WBC    Neutrophils (Absolute) 6.73 2.00 - 7.15 K/uL    Lymphs (Absolute) 2.25 1.00 - 4.80 K/uL    Monos (Absolute) 0.59 0.00 - 0.85 K/uL    Eos (Absolute) 0.11 0.00 - 0.51 K/uL    Baso (Absolute) 0.03 0.00 - 0.12 K/uL    Immature Granulocytes (abs) 0.04 0.00 - 0.11 K/uL    NRBC (Absolute) 0.00 K/uL   Comp Metabolic Panel    Collection Time: 01/20/22  8:17 PM   Result Value Ref Range    Sodium 135 135 - 145 mmol/L    Potassium 3.9 3.6 - 5.5 mmol/L    Chloride 105 96 - 112 mmol/L    Co2 20 20 - 33 mmol/L    Anion Gap 10.0 7.0 - 16.0    Glucose 84 65 - 99 mg/dL    Bun 10 8 - 22 mg/dL    Creatinine 0.55 0.50 - 1.40 mg/dL    Calcium 8.9 8.5 - 10.5 mg/dL    AST(SGOT) 17 12 - 45 U/L    ALT(SGPT) 16 2 - 50 U/L    Alkaline Phosphatase 149 (H) 30 - 99 U/L    Total Bilirubin <0.2 0.1 - 1.5 mg/dL    Albumin 3.3 3.2 - 4.9 g/dL    Total Protein 6.8 6.0 - 8.2 g/dL    Globulin 3.5 1.9 - 3.5 g/dL    A-G Ratio 0.9 g/dL   URIC ACID    Collection Time: 01/20/22  8:17 PM   Result Value Ref Range    Uric Acid 4.3 1.9 - 8.2 mg/dL   ESTIMATED GFR    Collection Time: 01/20/22  8:17 PM   Result Value Ref Range    GFR If African American >60 >60 mL/min/1.73 m 2    GFR If Non African American >60 >60 mL/min/1.73 m 2   POCT Fetal Nonstress Test    Collection Time: 01/26/22  1:52 PM   Result Value Ref Range    NST Indications      NST Baseline      NST Uterine Activity      NST Acoustic Stimulation      NST Assessment      NST Action Necessary      NST Other Data      NST Return      NST Read By     SARS-COV Antigen SRINATH: Collect dry Nasal swab    Collection Time: 01/29/22 12:37 PM   Result Value Ref Range    SARS-CoV-2 Source Nasal Swab     SARS-COV ANTIGEN SRINATH  NotDetected NotDetected   Hold Blood Bank Specimen (Not Tested)    Collection Time: 01/29/22 12:37 PM   Result Value Ref Range    Holding Tube - Bb DONE    CBC with Differential    Collection Time: 01/29/22 12:37 PM   Result Value Ref Range    WBC 10.5 4.8 - 10.8 K/uL    RBC 4.59 4.20 - 5.40 M/uL    Hemoglobin 13.7 12.0 - 16.0 g/dL    Hematocrit 40.2 37.0 - 47.0 %    MCV 87.6 81.4 - 97.8 fL    MCH 29.8 27.0 - 33.0 pg    MCHC 34.1 33.6 - 35.0 g/dL    RDW 44.5 35.9 - 50.0 fL    Platelet Count 250 164 - 446 K/uL    MPV 10.5 9.0 - 12.9 fL    Neutrophils-Polys 74.60 (H) 44.00 - 72.00 %    Lymphocytes 18.60 (L) 22.00 - 41.00 %    Monocytes 5.20 0.00 - 13.40 %    Eosinophils 0.60 0.00 - 6.90 %    Basophils 0.40 0.00 - 1.80 %    Immature Granulocytes 0.60 0.00 - 0.90 %    Nucleated RBC 0.00 /100 WBC    Neutrophils (Absolute) 7.85 (H) 2.00 - 7.15 K/uL    Lymphs (Absolute) 1.95 1.00 - 4.80 K/uL    Monos (Absolute) 0.55 0.00 - 0.85 K/uL    Eos (Absolute) 0.06 0.00 - 0.51 K/uL    Baso (Absolute) 0.04 0.00 - 0.12 K/uL    Immature Granulocytes (abs) 0.06 0.00 - 0.11 K/uL    NRBC (Absolute) 0.00 K/uL   Comp Metabolic Panel    Collection Time: 01/29/22  4:02 PM   Result Value Ref Range    Sodium 137 135 - 145 mmol/L    Potassium 4.4 3.6 - 5.5 mmol/L    Chloride 105 96 - 112 mmol/L    Co2 21 20 - 33 mmol/L    Anion Gap 11.0 7.0 - 16.0    Glucose 90 65 - 99 mg/dL    Bun 8 8 - 22 mg/dL    Creatinine 0.43 (L) 0.50 - 1.40 mg/dL    Calcium 8.6 8.5 - 10.5 mg/dL    AST(SGOT) 21 12 - 45 U/L    ALT(SGPT) 17 2 - 50 U/L    Alkaline Phosphatase 140 (H) 30 - 99 U/L    Total Bilirubin <0.2 0.1 - 1.5 mg/dL    Albumin 3.4 3.2 - 4.9 g/dL    Total Protein 6.4 6.0 - 8.2 g/dL    Globulin 3.0 1.9 - 3.5 g/dL    A-G Ratio 1.1 g/dL   ESTIMATED GFR    Collection Time: 01/29/22  4:02 PM   Result Value Ref Range    GFR If African American >60 >60 mL/min/1.73 m 2    GFR If Non African American >60 >60 mL/min/1.73 m 2   PROTEIN/CREAT RATIO URINE    Collection  Time: 01/29/22  4:50 PM   Result Value Ref Range    Total Protein, Urine 18.0 (H) 0.0 - 15.0 mg/dL    Creatinine, Random Urine 60.09 mg/dL    Protein Creatinine Ratio 300 (H) 10 - 107 mg/g   CBC without differential- Once in 8 hours post delivery    Collection Time: 01/29/22 11:25 PM   Result Value Ref Range    WBC 21.4 (H) 4.8 - 10.8 K/uL    RBC 4.19 (L) 4.20 - 5.40 M/uL    Hemoglobin 12.4 12.0 - 16.0 g/dL    Hematocrit 37.3 37.0 - 47.0 %    MCV 89.0 81.4 - 97.8 fL    MCH 29.6 27.0 - 33.0 pg    MCHC 33.2 (L) 33.6 - 35.0 g/dL    RDW 45.0 35.9 - 50.0 fL    Platelet Count 244 164 - 446 K/uL    MPV 10.8 9.0 - 12.9 fL       PLAN:  Continue any current medications.  (See Med List for details.)  Return to clinic: in 4 week(s).    Incision appears to be healing well.  No signs of infection.  Patient was asked to keep the Steri-Strips in place for another week.  She may remove them at that time.  Precautions discussed with patient

## 2024-10-18 ENCOUNTER — APPOINTMENT (OUTPATIENT)
Dept: RADIOLOGY | Facility: MEDICAL CENTER | Age: 39
End: 2024-10-18
Attending: STUDENT IN AN ORGANIZED HEALTH CARE EDUCATION/TRAINING PROGRAM
Payer: COMMERCIAL

## 2024-10-18 ENCOUNTER — PHARMACY VISIT (OUTPATIENT)
Dept: PHARMACY | Facility: MEDICAL CENTER | Age: 39
End: 2024-10-18
Payer: COMMERCIAL

## 2024-10-18 ENCOUNTER — HOSPITAL ENCOUNTER (EMERGENCY)
Facility: MEDICAL CENTER | Age: 39
End: 2024-10-18
Attending: STUDENT IN AN ORGANIZED HEALTH CARE EDUCATION/TRAINING PROGRAM
Payer: COMMERCIAL

## 2024-10-18 VITALS
DIASTOLIC BLOOD PRESSURE: 85 MMHG | BODY MASS INDEX: 28.2 KG/M2 | SYSTOLIC BLOOD PRESSURE: 143 MMHG | OXYGEN SATURATION: 95 % | HEIGHT: 62 IN | WEIGHT: 153.22 LBS | HEART RATE: 62 BPM | TEMPERATURE: 98.7 F | RESPIRATION RATE: 20 BRPM

## 2024-10-18 DIAGNOSIS — I10 PRIMARY HYPERTENSION: ICD-10-CM

## 2024-10-18 DIAGNOSIS — R07.9 CHEST PAIN, UNSPECIFIED TYPE: ICD-10-CM

## 2024-10-18 LAB
ALBUMIN SERPL BCP-MCNC: 4.3 G/DL (ref 3.2–4.9)
ALBUMIN/GLOB SERPL: 1.3 G/DL
ALP SERPL-CCNC: 50 U/L (ref 30–99)
ALT SERPL-CCNC: 14 U/L (ref 2–50)
ANION GAP SERPL CALC-SCNC: 10 MMOL/L (ref 7–16)
AST SERPL-CCNC: 17 U/L (ref 12–45)
BASOPHILS # BLD AUTO: 0.6 % (ref 0–1.8)
BASOPHILS # BLD: 0.04 K/UL (ref 0–0.12)
BILIRUB SERPL-MCNC: 0.3 MG/DL (ref 0.1–1.5)
BUN SERPL-MCNC: 8 MG/DL (ref 8–22)
CALCIUM ALBUM COR SERPL-MCNC: 9 MG/DL (ref 8.5–10.5)
CALCIUM SERPL-MCNC: 9.2 MG/DL (ref 8.5–10.5)
CHLORIDE SERPL-SCNC: 105 MMOL/L (ref 96–112)
CO2 SERPL-SCNC: 23 MMOL/L (ref 20–33)
CREAT SERPL-MCNC: 0.6 MG/DL (ref 0.5–1.4)
EKG IMPRESSION: NORMAL
EOSINOPHIL # BLD AUTO: 0.1 K/UL (ref 0–0.51)
EOSINOPHIL NFR BLD: 1.6 % (ref 0–6.9)
ERYTHROCYTE [DISTWIDTH] IN BLOOD BY AUTOMATED COUNT: 41.3 FL (ref 35.9–50)
GFR SERPLBLD CREATININE-BSD FMLA CKD-EPI: 117 ML/MIN/1.73 M 2
GLOBULIN SER CALC-MCNC: 3.2 G/DL (ref 1.9–3.5)
GLUCOSE SERPL-MCNC: 98 MG/DL (ref 65–99)
HCG SERPL QL: NEGATIVE
HCT VFR BLD AUTO: 38.2 % (ref 37–47)
HGB BLD-MCNC: 12.8 G/DL (ref 12–16)
IMM GRANULOCYTES # BLD AUTO: 0.01 K/UL (ref 0–0.11)
IMM GRANULOCYTES NFR BLD AUTO: 0.2 % (ref 0–0.9)
LIPASE SERPL-CCNC: 23 U/L (ref 11–82)
LYMPHOCYTES # BLD AUTO: 2.84 K/UL (ref 1–4.8)
LYMPHOCYTES NFR BLD: 45.3 % (ref 22–41)
MCH RBC QN AUTO: 29.7 PG (ref 27–33)
MCHC RBC AUTO-ENTMCNC: 33.5 G/DL (ref 32.2–35.5)
MCV RBC AUTO: 88.6 FL (ref 81.4–97.8)
MONOCYTES # BLD AUTO: 0.4 K/UL (ref 0–0.85)
MONOCYTES NFR BLD AUTO: 6.4 % (ref 0–13.4)
NEUTROPHILS # BLD AUTO: 2.88 K/UL (ref 1.82–7.42)
NEUTROPHILS NFR BLD: 45.9 % (ref 44–72)
NRBC # BLD AUTO: 0 K/UL
NRBC BLD-RTO: 0 /100 WBC (ref 0–0.2)
PLATELET # BLD AUTO: 287 K/UL (ref 164–446)
PMV BLD AUTO: 10.1 FL (ref 9–12.9)
POTASSIUM SERPL-SCNC: 3.7 MMOL/L (ref 3.6–5.5)
PROT SERPL-MCNC: 7.5 G/DL (ref 6–8.2)
RBC # BLD AUTO: 4.31 M/UL (ref 4.2–5.4)
SODIUM SERPL-SCNC: 138 MMOL/L (ref 135–145)
TROPONIN T SERPL-MCNC: <6 NG/L (ref 6–19)
WBC # BLD AUTO: 6.3 K/UL (ref 4.8–10.8)

## 2024-10-18 PROCEDURE — 85025 COMPLETE CBC W/AUTO DIFF WBC: CPT

## 2024-10-18 PROCEDURE — RXMED WILLOW AMBULATORY MEDICATION CHARGE: Performed by: STUDENT IN AN ORGANIZED HEALTH CARE EDUCATION/TRAINING PROGRAM

## 2024-10-18 PROCEDURE — 93005 ELECTROCARDIOGRAM TRACING: CPT

## 2024-10-18 PROCEDURE — 93005 ELECTROCARDIOGRAM TRACING: CPT | Performed by: STUDENT IN AN ORGANIZED HEALTH CARE EDUCATION/TRAINING PROGRAM

## 2024-10-18 PROCEDURE — 99283 EMERGENCY DEPT VISIT LOW MDM: CPT

## 2024-10-18 PROCEDURE — 71045 X-RAY EXAM CHEST 1 VIEW: CPT

## 2024-10-18 PROCEDURE — 80053 COMPREHEN METABOLIC PANEL: CPT

## 2024-10-18 PROCEDURE — 84703 CHORIONIC GONADOTROPIN ASSAY: CPT

## 2024-10-18 PROCEDURE — 84484 ASSAY OF TROPONIN QUANT: CPT

## 2024-10-18 PROCEDURE — 83690 ASSAY OF LIPASE: CPT

## 2024-10-18 PROCEDURE — 36415 COLL VENOUS BLD VENIPUNCTURE: CPT

## 2024-10-18 RX ORDER — AMLODIPINE BESYLATE 5 MG/1
5 TABLET ORAL DAILY
Qty: 30 TABLET | Refills: 0 | Status: SHIPPED | OUTPATIENT
Start: 2024-10-18

## (undated) DEVICE — BLANKET UNDERBODY FULL ACCES - (5/CA)

## (undated) DEVICE — KIT  I.V. START (100EA/CA)

## (undated) DEVICE — PACK C-SECTION (2EA/CA)

## (undated) DEVICE — CHLORAPREP 26 ML APPLICATOR - ORANGE TINT(25/CA)

## (undated) DEVICE — SLEEVE, SEQUENTIAL CALF REG

## (undated) DEVICE — GLOVE BIOGEL SZ 8 SURGICAL PF LTX - (50PR/BX 4BX/CA)

## (undated) DEVICE — TAPE CLOTH MEDIPORE 6 INCH - (12RL/CA)

## (undated) DEVICE — LACTATED RINGERS INJ 1000 ML - (14EA/CA 60CA/PF)

## (undated) DEVICE — STAPLER SKIN DISP - (6/BX 10BX/CA) VISISTAT

## (undated) DEVICE — SUTURE 3-0 VICRYL PLUS CT-1 - 36 INCH (36/BX)

## (undated) DEVICE — CATHETER IV NON-SAFETY 18 GA X 1 1/4 (50/BX 4BX/CA)

## (undated) DEVICE — WATER IRRIG. STER. 1500 ML - (9/CA)

## (undated) DEVICE — SUTURE 0 VICRYL PLUS CT-1 - 36 INCH (36/BX)

## (undated) DEVICE — SODIUM CHL IRRIGATION 0.9% 1000ML (12EA/CA)

## (undated) DEVICE — SUTURE 0 VICRYL PLUS CT 36 (36PK/BX)"

## (undated) DEVICE — SUTURE 1 CHROMIC CTX ETHICON - (36PK/BX)

## (undated) DEVICE — DRESSING INTERCEED ABSORBABLE ADHESION BARRIER TC7 (10EA/CA)

## (undated) DEVICE — TRAY BLADDER CARE W/ 16 FR FOLEY CATHETER STATLOCK  (10/CA)

## (undated) DEVICE — TRAY SPINAL ANESTHESIA NON-SAFETY (10/CA)

## (undated) DEVICE — WATER IRRIGATION STERILE 1000ML (12EA/CA)

## (undated) DEVICE — CANISTER SUCTION 3000ML MECHANICAL FILTER AUTO SHUTOFF MEDI-VAC NONSTERILE LF DISP  (40EA/CA)

## (undated) DEVICE — SET EXTENSION WITH 2 PORTS (48EA/CA) ***PART #2C8610 IS A SUBSTITUTE*****

## (undated) DEVICE — SHEATH RO 4F 10CM (10EA/BX)

## (undated) DEVICE — LIGASURE SM JAW SEALER CRVD - (6EA/CA)

## (undated) DEVICE — SUTURE 0 GUT-PLAIN (36PK/BX)

## (undated) DEVICE — HEAD HOLDER JUNIOR/ADULT

## (undated) DEVICE — TUBING CLEARLINK DUO-VENT - C-FLO (48EA/CA)

## (undated) DEVICE — PACK ROOM TURNOVER L&D (12/CA)

## (undated) DEVICE — SUTURE 2-0 CHROMIC GUT CT-1 27 (36PK/BX)"

## (undated) DEVICE — DETERGENT RENUZYME PLUS 10 OZ PACKET (50/BX)

## (undated) DEVICE — ELECTRODE DUAL RETURN W/ CORD - (50/PK)

## (undated) DEVICE — SUTURE 2-0 VICRYL PLUS CT-1 36 (36PK/BX)"

## (undated) DEVICE — PENCIL ELECTSURG 10FT HLSTR - WITH BLADE (50EA/CA)

## (undated) DEVICE — AGENT HEMOSTATIC BELLOW HEMOBLAST (12EA/CA)